# Patient Record
Sex: FEMALE | Race: WHITE | NOT HISPANIC OR LATINO | Employment: UNEMPLOYED | URBAN - NONMETROPOLITAN AREA
[De-identification: names, ages, dates, MRNs, and addresses within clinical notes are randomized per-mention and may not be internally consistent; named-entity substitution may affect disease eponyms.]

---

## 2017-01-17 ENCOUNTER — HOSPITAL ENCOUNTER (EMERGENCY)
Facility: HOSPITAL | Age: 22
Discharge: HOME | End: 2017-01-17
Attending: EMERGENCY MEDICINE | Admitting: EMERGENCY MEDICINE
Payer: COMMERCIAL

## 2017-01-17 VITALS
HEART RATE: 100 BPM | WEIGHT: 140 LBS | HEIGHT: 64 IN | TEMPERATURE: 98.1 F | OXYGEN SATURATION: 98 % | BODY MASS INDEX: 23.9 KG/M2 | DIASTOLIC BLOOD PRESSURE: 68 MMHG | SYSTOLIC BLOOD PRESSURE: 110 MMHG | RESPIRATION RATE: 16 BRPM

## 2017-01-17 DIAGNOSIS — F15.20 METHAMPHETAMINE ADDICTION (HCC): ICD-10-CM

## 2017-01-17 DIAGNOSIS — F19.20 DRUG ABUSE AND DEPENDENCE (HCC): Primary | ICD-10-CM

## 2017-01-17 DIAGNOSIS — F11.23 HEROIN WITHDRAWAL (HCC): ICD-10-CM

## 2017-01-17 DIAGNOSIS — F11.10 HEROIN ABUSE (HCC): ICD-10-CM

## 2017-01-17 LAB
ALBUMIN SERPL BCP-MCNC: 3.3 G/DL (ref 3.5–5)
ALP SERPL-CCNC: 162 U/L (ref 46–116)
ALT SERPL W P-5'-P-CCNC: 342 U/L (ref 12–78)
ANION GAP SERPL CALCULATED.3IONS-SCNC: 11 MMOL/L (ref 4–13)
AST SERPL W P-5'-P-CCNC: 175 U/L (ref 5–45)
BASOPHILS # BLD AUTO: 0.02 THOUSANDS/ΜL (ref 0–0.1)
BASOPHILS NFR BLD AUTO: 0 % (ref 0–1)
BILIRUB SERPL-MCNC: 0.7 MG/DL (ref 0.2–1)
BILIRUB UR QL STRIP: NEGATIVE
BUN SERPL-MCNC: 11 MG/DL (ref 5–25)
CALCIUM SERPL-MCNC: 9.5 MG/DL (ref 8.3–10.1)
CHLORIDE SERPL-SCNC: 104 MMOL/L (ref 100–108)
CLARITY UR: NORMAL
CO2 SERPL-SCNC: 23 MMOL/L (ref 21–32)
COLOR UR: YELLOW
CREAT SERPL-MCNC: 0.66 MG/DL (ref 0.6–1.3)
EOSINOPHIL # BLD AUTO: 0.15 THOUSAND/ΜL (ref 0–0.61)
EOSINOPHIL NFR BLD AUTO: 1 % (ref 0–6)
ERYTHROCYTE [DISTWIDTH] IN BLOOD BY AUTOMATED COUNT: 13.2 % (ref 11.6–15.1)
GFR SERPL CREATININE-BSD FRML MDRD: >60 ML/MIN/1.73SQ M
GLUCOSE SERPL-MCNC: 112 MG/DL (ref 65–140)
GLUCOSE UR STRIP-MCNC: NEGATIVE MG/DL
HCG UR QL: NEGATIVE
HCT VFR BLD AUTO: 41.2 % (ref 34.8–46.1)
HGB BLD-MCNC: 13.7 G/DL (ref 11.5–15.4)
HGB UR QL STRIP.AUTO: NEGATIVE
KETONES UR STRIP-MCNC: NEGATIVE MG/DL
LEUKOCYTE ESTERASE UR QL STRIP: NEGATIVE
LYMPHOCYTES # BLD AUTO: 1.84 THOUSANDS/ΜL (ref 0.6–4.47)
LYMPHOCYTES NFR BLD AUTO: 13 % (ref 14–44)
MCH RBC QN AUTO: 28.7 PG (ref 26.8–34.3)
MCHC RBC AUTO-ENTMCNC: 33.3 G/DL (ref 31.4–37.4)
MCV RBC AUTO: 86 FL (ref 82–98)
MONOCYTES # BLD AUTO: 1.1 THOUSAND/ΜL (ref 0.17–1.22)
MONOCYTES NFR BLD AUTO: 8 % (ref 4–12)
NEUTROPHILS # BLD AUTO: 10.82 THOUSANDS/ΜL (ref 1.85–7.62)
NEUTS SEG NFR BLD AUTO: 78 % (ref 43–75)
NITRITE UR QL STRIP: NEGATIVE
PH UR STRIP.AUTO: 6 [PH] (ref 4.5–8)
PLATELET # BLD AUTO: 202 THOUSANDS/UL (ref 149–390)
PMV BLD AUTO: 9.8 FL (ref 8.9–12.7)
POTASSIUM SERPL-SCNC: 4.2 MMOL/L (ref 3.5–5.3)
PROT SERPL-MCNC: 7.3 G/DL (ref 6.4–8.2)
PROT UR STRIP-MCNC: NEGATIVE MG/DL
RBC # BLD AUTO: 4.78 MILLION/UL (ref 3.81–5.12)
SODIUM SERPL-SCNC: 138 MMOL/L (ref 136–145)
SP GR UR STRIP.AUTO: <=1.005 (ref 1–1.03)
UROBILINOGEN UR QL STRIP.AUTO: 0.2 E.U./DL
WBC # BLD AUTO: 13.93 THOUSAND/UL (ref 4.31–10.16)

## 2017-01-17 PROCEDURE — 80053 COMPREHEN METABOLIC PANEL: CPT | Performed by: EMERGENCY MEDICINE

## 2017-01-17 PROCEDURE — 36415 COLL VENOUS BLD VENIPUNCTURE: CPT | Performed by: EMERGENCY MEDICINE

## 2017-01-17 PROCEDURE — 81025 URINE PREGNANCY TEST: CPT | Performed by: EMERGENCY MEDICINE

## 2017-01-17 PROCEDURE — 85025 COMPLETE CBC W/AUTO DIFF WBC: CPT | Performed by: EMERGENCY MEDICINE

## 2017-01-17 PROCEDURE — 99283 EMERGENCY DEPT VISIT LOW MDM: CPT

## 2017-01-17 PROCEDURE — 96374 THER/PROPH/DIAG INJ IV PUSH: CPT

## 2017-01-17 PROCEDURE — 81003 URINALYSIS AUTO W/O SCOPE: CPT | Performed by: EMERGENCY MEDICINE

## 2017-01-17 RX ORDER — ONDANSETRON 4 MG/1
4 TABLET, ORALLY DISINTEGRATING ORAL EVERY 8 HOURS PRN
Qty: 20 TABLET | Refills: 0 | Status: SHIPPED | OUTPATIENT
Start: 2017-01-17 | End: 2017-09-01 | Stop reason: HOSPADM

## 2017-01-17 RX ORDER — DIAZEPAM 5 MG/ML
10 INJECTION, SOLUTION INTRAMUSCULAR; INTRAVENOUS ONCE
Status: COMPLETED | OUTPATIENT
Start: 2017-01-17 | End: 2017-01-17

## 2017-01-17 RX ADMIN — DIAZEPAM 10 MG: 5 INJECTION, SOLUTION INTRAMUSCULAR; INTRAVENOUS at 05:49

## 2017-08-28 ENCOUNTER — HOSPITAL ENCOUNTER (EMERGENCY)
Facility: HOSPITAL | Age: 22
Discharge: HOME | End: 2017-08-28
Attending: EMERGENCY MEDICINE
Payer: COMMERCIAL

## 2017-08-28 VITALS
BODY MASS INDEX: 24.75 KG/M2 | TEMPERATURE: 97.6 F | DIASTOLIC BLOOD PRESSURE: 54 MMHG | OXYGEN SATURATION: 100 % | RESPIRATION RATE: 18 BRPM | HEART RATE: 97 BPM | WEIGHT: 144.18 LBS | SYSTOLIC BLOOD PRESSURE: 92 MMHG

## 2017-08-28 DIAGNOSIS — T50.901A: Primary | ICD-10-CM

## 2017-08-28 PROCEDURE — 99283 EMERGENCY DEPT VISIT LOW MDM: CPT

## 2017-08-29 ENCOUNTER — HOSPITAL ENCOUNTER (EMERGENCY)
Facility: HOSPITAL | Age: 22
Discharge: LEFT BEFORE DISCHARGE | End: 2017-08-29
Payer: COMMERCIAL

## 2017-08-29 ENCOUNTER — APPOINTMENT (EMERGENCY)
Dept: ULTRASOUND IMAGING | Facility: HOSPITAL | Age: 22
End: 2017-08-29
Payer: COMMERCIAL

## 2017-08-29 ENCOUNTER — HOSPITAL ENCOUNTER (EMERGENCY)
Facility: HOSPITAL | Age: 22
End: 2017-08-30
Admitting: EMERGENCY MEDICINE
Payer: COMMERCIAL

## 2017-08-29 VITALS
TEMPERATURE: 98.1 F | SYSTOLIC BLOOD PRESSURE: 101 MMHG | OXYGEN SATURATION: 100 % | WEIGHT: 144.84 LBS | DIASTOLIC BLOOD PRESSURE: 55 MMHG | HEIGHT: 64 IN | BODY MASS INDEX: 24.73 KG/M2 | HEART RATE: 95 BPM | RESPIRATION RATE: 18 BRPM

## 2017-08-29 DIAGNOSIS — F19.10 POLYSUBSTANCE ABUSE (HCC): Primary | ICD-10-CM

## 2017-08-29 DIAGNOSIS — N93.9 VAGINAL BLEEDING: Primary | ICD-10-CM

## 2017-08-29 DIAGNOSIS — F32.A DEPRESSION: ICD-10-CM

## 2017-08-29 LAB
ALBUMIN SERPL BCP-MCNC: 3.3 G/DL (ref 3.5–5)
ALP SERPL-CCNC: 63 U/L (ref 46–116)
ALT SERPL W P-5'-P-CCNC: 18 U/L (ref 12–78)
AMPHETAMINES SERPL QL SCN: POSITIVE
ANION GAP SERPL CALCULATED.3IONS-SCNC: 7 MMOL/L (ref 4–13)
APAP SERPL-MCNC: <2 UG/ML (ref 10–30)
APTT PPP: 33 SECONDS (ref 23–35)
AST SERPL W P-5'-P-CCNC: 15 U/L (ref 5–45)
B-HCG SERPL-ACNC: 9464 MIU/ML
BARBITURATES UR QL: NEGATIVE
BASOPHILS # BLD AUTO: 0.06 THOUSANDS/ΜL (ref 0–0.1)
BASOPHILS NFR BLD AUTO: 1 % (ref 0–1)
BENZODIAZ UR QL: POSITIVE
BILIRUB SERPL-MCNC: 0.7 MG/DL (ref 0.2–1)
BUN SERPL-MCNC: 11 MG/DL (ref 5–25)
CALCIUM SERPL-MCNC: 9.5 MG/DL (ref 8.3–10.1)
CHLORIDE SERPL-SCNC: 103 MMOL/L (ref 100–108)
CO2 SERPL-SCNC: 26 MMOL/L (ref 21–32)
COCAINE UR QL: NEGATIVE
CREAT SERPL-MCNC: 0.71 MG/DL (ref 0.6–1.3)
EOSINOPHIL # BLD AUTO: 0.2 THOUSAND/ΜL (ref 0–0.61)
EOSINOPHIL NFR BLD AUTO: 2 % (ref 0–6)
ERYTHROCYTE [DISTWIDTH] IN BLOOD BY AUTOMATED COUNT: 13.7 % (ref 11.6–15.1)
ETHANOL SERPL-MCNC: <3 MG/DL (ref 0–3)
GFR SERPL CREATININE-BSD FRML MDRD: 120 ML/MIN/1.73SQ M
GLUCOSE SERPL-MCNC: 94 MG/DL (ref 65–140)
HCT VFR BLD AUTO: 35.5 % (ref 34.8–46.1)
HGB BLD-MCNC: 11.8 G/DL (ref 11.5–15.4)
INR PPP: 0.91 (ref 0.86–1.16)
LYMPHOCYTES # BLD AUTO: 3.01 THOUSANDS/ΜL (ref 0.6–4.47)
LYMPHOCYTES NFR BLD AUTO: 23 % (ref 14–44)
MCH RBC QN AUTO: 27.7 PG (ref 26.8–34.3)
MCHC RBC AUTO-ENTMCNC: 33.2 G/DL (ref 31.4–37.4)
MCV RBC AUTO: 83 FL (ref 82–98)
METHADONE UR QL: NEGATIVE
MONOCYTES # BLD AUTO: 1.12 THOUSAND/ΜL (ref 0.17–1.22)
MONOCYTES NFR BLD AUTO: 9 % (ref 4–12)
NEUTROPHILS # BLD AUTO: 8.67 THOUSANDS/ΜL (ref 1.85–7.62)
NEUTS SEG NFR BLD AUTO: 65 % (ref 43–75)
OPIATES UR QL SCN: POSITIVE
PCP UR QL: NEGATIVE
PLATELET # BLD AUTO: 308 THOUSANDS/UL (ref 149–390)
PMV BLD AUTO: 9.2 FL (ref 8.9–12.7)
POTASSIUM SERPL-SCNC: 4.2 MMOL/L (ref 3.5–5.3)
PROT SERPL-MCNC: 7.5 G/DL (ref 6.4–8.2)
PROTHROMBIN TIME: 12.2 SECONDS (ref 12.1–14.4)
RBC # BLD AUTO: 4.26 MILLION/UL (ref 3.81–5.12)
SALICYLATES SERPL-MCNC: <3 MG/DL (ref 3–20)
SODIUM SERPL-SCNC: 136 MMOL/L (ref 136–145)
THC UR QL: NEGATIVE
WBC # BLD AUTO: 13.06 THOUSAND/UL (ref 4.31–10.16)

## 2017-08-29 PROCEDURE — 85730 THROMBOPLASTIN TIME PARTIAL: CPT | Performed by: PHYSICIAN ASSISTANT

## 2017-08-29 PROCEDURE — 80307 DRUG TEST PRSMV CHEM ANLYZR: CPT | Performed by: PHYSICIAN ASSISTANT

## 2017-08-29 PROCEDURE — 36415 COLL VENOUS BLD VENIPUNCTURE: CPT | Performed by: PHYSICIAN ASSISTANT

## 2017-08-29 PROCEDURE — 80320 DRUG SCREEN QUANTALCOHOLS: CPT | Performed by: PHYSICIAN ASSISTANT

## 2017-08-29 PROCEDURE — 85025 COMPLETE CBC W/AUTO DIFF WBC: CPT | Performed by: PHYSICIAN ASSISTANT

## 2017-08-29 PROCEDURE — 80053 COMPREHEN METABOLIC PANEL: CPT | Performed by: PHYSICIAN ASSISTANT

## 2017-08-29 PROCEDURE — 76801 OB US < 14 WKS SINGLE FETUS: CPT

## 2017-08-29 PROCEDURE — 85610 PROTHROMBIN TIME: CPT | Performed by: PHYSICIAN ASSISTANT

## 2017-08-29 PROCEDURE — 84702 CHORIONIC GONADOTROPIN TEST: CPT | Performed by: PHYSICIAN ASSISTANT

## 2017-08-29 PROCEDURE — 96372 THER/PROPH/DIAG INJ SC/IM: CPT

## 2017-08-29 PROCEDURE — 96374 THER/PROPH/DIAG INJ IV PUSH: CPT

## 2017-08-29 PROCEDURE — 99283 EMERGENCY DEPT VISIT LOW MDM: CPT

## 2017-08-29 PROCEDURE — 80329 ANALGESICS NON-OPIOID 1 OR 2: CPT | Performed by: PHYSICIAN ASSISTANT

## 2017-08-29 RX ORDER — HALOPERIDOL 5 MG/ML
INJECTION INTRAMUSCULAR
Status: COMPLETED
Start: 2017-08-29 | End: 2017-08-29

## 2017-08-29 RX ORDER — HALOPERIDOL 5 MG/ML
5 INJECTION INTRAMUSCULAR ONCE
Status: COMPLETED | OUTPATIENT
Start: 2017-08-29 | End: 2017-08-29

## 2017-08-29 RX ORDER — LORAZEPAM 2 MG/ML
1 INJECTION INTRAMUSCULAR ONCE
Status: COMPLETED | OUTPATIENT
Start: 2017-08-29 | End: 2017-08-29

## 2017-08-29 RX ORDER — LORAZEPAM 2 MG/ML
INJECTION INTRAMUSCULAR
Status: COMPLETED
Start: 2017-08-29 | End: 2017-08-29

## 2017-08-29 RX ORDER — CLONIDINE HYDROCHLORIDE 0.1 MG/1
0.2 TABLET ORAL ONCE
Status: COMPLETED | OUTPATIENT
Start: 2017-08-29 | End: 2017-08-29

## 2017-08-29 RX ADMIN — LORAZEPAM 1 MG: 2 INJECTION INTRAMUSCULAR; INTRAVENOUS at 16:13

## 2017-08-29 RX ADMIN — HALOPERIDOL LACTATE 5 MG: 5 INJECTION, SOLUTION INTRAMUSCULAR at 15:00

## 2017-08-29 RX ADMIN — CLONIDINE HYDROCHLORIDE 0.2 MG: 0.1 TABLET ORAL at 16:21

## 2017-08-29 RX ADMIN — HALOPERIDOL LACTATE 5 MG: 5 INJECTION, SOLUTION INTRAMUSCULAR at 16:13

## 2017-08-29 RX ADMIN — LORAZEPAM 1 MG: 2 INJECTION INTRAMUSCULAR at 15:01

## 2017-08-29 RX ADMIN — LORAZEPAM 1 MG: 2 INJECTION, SOLUTION INTRAMUSCULAR; INTRAVENOUS at 15:01

## 2017-08-30 VITALS
DIASTOLIC BLOOD PRESSURE: 68 MMHG | OXYGEN SATURATION: 100 % | TEMPERATURE: 98.9 F | BODY MASS INDEX: 24.73 KG/M2 | RESPIRATION RATE: 20 BRPM | HEART RATE: 110 BPM | WEIGHT: 144.84 LBS | HEIGHT: 64 IN | SYSTOLIC BLOOD PRESSURE: 104 MMHG

## 2017-08-30 LAB — B-HCG SERPL-ACNC: 3182 MIU/ML

## 2017-08-30 PROCEDURE — 84702 CHORIONIC GONADOTROPIN TEST: CPT | Performed by: EMERGENCY MEDICINE

## 2017-08-30 PROCEDURE — 36415 COLL VENOUS BLD VENIPUNCTURE: CPT | Performed by: EMERGENCY MEDICINE

## 2017-08-30 PROCEDURE — 99285 EMERGENCY DEPT VISIT HI MDM: CPT

## 2017-08-30 RX ORDER — LORAZEPAM 1 MG/1
1 TABLET ORAL EVERY 4 HOURS PRN
Status: CANCELLED | OUTPATIENT
Start: 2017-08-30

## 2017-08-30 RX ORDER — LORAZEPAM 1 MG/1
1 TABLET ORAL ONCE
Status: COMPLETED | OUTPATIENT
Start: 2017-08-30 | End: 2017-08-30

## 2017-08-30 RX ORDER — TRAZODONE HYDROCHLORIDE 50 MG/1
50 TABLET ORAL
Status: CANCELLED | OUTPATIENT
Start: 2017-08-30

## 2017-08-30 RX ORDER — LORAZEPAM 2 MG/ML
2 INJECTION INTRAMUSCULAR EVERY 4 HOURS PRN
Status: CANCELLED | OUTPATIENT
Start: 2017-08-30

## 2017-08-30 RX ORDER — MAGNESIUM HYDROXIDE/ALUMINUM HYDROXICE/SIMETHICONE 120; 1200; 1200 MG/30ML; MG/30ML; MG/30ML
30 SUSPENSION ORAL EVERY 4 HOURS PRN
Status: CANCELLED | OUTPATIENT
Start: 2017-08-30

## 2017-08-30 RX ORDER — BENZTROPINE MESYLATE 1 MG/ML
1 INJECTION INTRAMUSCULAR; INTRAVENOUS EVERY 6 HOURS PRN
Status: CANCELLED | OUTPATIENT
Start: 2017-08-30

## 2017-08-30 RX ORDER — RISPERIDONE 1 MG/1
1 TABLET, ORALLY DISINTEGRATING ORAL
Status: CANCELLED | OUTPATIENT
Start: 2017-08-30

## 2017-08-30 RX ORDER — ACETAMINOPHEN 325 MG/1
650 TABLET ORAL EVERY 6 HOURS PRN
Status: CANCELLED | OUTPATIENT
Start: 2017-08-30

## 2017-08-30 RX ORDER — CHLORPROMAZINE HYDROCHLORIDE 25 MG/ML
50 INJECTION INTRAMUSCULAR EVERY 8 HOURS PRN
Status: CANCELLED | OUTPATIENT
Start: 2017-08-30

## 2017-08-30 RX ORDER — OLANZAPINE 10 MG/1
10 TABLET ORAL
Status: CANCELLED | OUTPATIENT
Start: 2017-08-30

## 2017-08-30 RX ORDER — HALOPERIDOL 5 MG/ML
5 INJECTION INTRAMUSCULAR EVERY 6 HOURS PRN
Status: CANCELLED | OUTPATIENT
Start: 2017-08-30

## 2017-08-30 RX ORDER — HALOPERIDOL 5 MG
5 TABLET ORAL EVERY 6 HOURS PRN
Status: CANCELLED | OUTPATIENT
Start: 2017-08-30

## 2017-08-30 RX ORDER — ALPRAZOLAM 0.5 MG/1
1 TABLET ORAL ONCE
Status: COMPLETED | OUTPATIENT
Start: 2017-08-30 | End: 2017-08-30

## 2017-08-30 RX ORDER — BENZTROPINE MESYLATE 1 MG/1
1 TABLET ORAL EVERY 6 HOURS PRN
Status: CANCELLED | OUTPATIENT
Start: 2017-08-30

## 2017-08-30 RX ORDER — CHLORPROMAZINE HYDROCHLORIDE 25 MG/1
50 TABLET, FILM COATED ORAL EVERY 8 HOURS PRN
Status: CANCELLED | OUTPATIENT
Start: 2017-08-30

## 2017-08-30 RX ORDER — OLANZAPINE 10 MG/1
10 INJECTION, POWDER, LYOPHILIZED, FOR SOLUTION INTRAMUSCULAR
Status: CANCELLED | OUTPATIENT
Start: 2017-08-30

## 2017-08-30 RX ADMIN — ALPRAZOLAM 1 MG: 0.5 TABLET ORAL at 17:58

## 2017-08-30 RX ADMIN — LORAZEPAM 1 MG: 1 TABLET ORAL at 17:58

## 2017-08-31 ENCOUNTER — HOSPITAL ENCOUNTER (INPATIENT)
Facility: HOSPITAL | Age: 22
LOS: 1 days | Discharge: HOME | DRG: 885 | End: 2017-09-01
Attending: PSYCHIATRY & NEUROLOGY | Admitting: PSYCHIATRY & NEUROLOGY
Payer: COMMERCIAL

## 2017-08-31 DIAGNOSIS — F13.10 BENZODIAZEPINE ABUSE (HCC): ICD-10-CM

## 2017-08-31 DIAGNOSIS — F11.10 OPIOID ABUSE (HCC): ICD-10-CM

## 2017-08-31 DIAGNOSIS — F15.10 METHAMPHETAMINE ABUSE (HCC): ICD-10-CM

## 2017-08-31 DIAGNOSIS — F39 MOOD DISORDER (HCC): Primary | ICD-10-CM

## 2017-08-31 RX ORDER — LORAZEPAM 2 MG/ML
2 INJECTION INTRAMUSCULAR EVERY 4 HOURS PRN
Status: DISCONTINUED | OUTPATIENT
Start: 2017-08-31 | End: 2017-09-01 | Stop reason: HOSPADM

## 2017-08-31 RX ORDER — BENZTROPINE MESYLATE 1 MG/ML
1 INJECTION INTRAMUSCULAR; INTRAVENOUS EVERY 6 HOURS PRN
Status: DISCONTINUED | OUTPATIENT
Start: 2017-08-31 | End: 2017-09-01 | Stop reason: HOSPADM

## 2017-08-31 RX ORDER — TRAZODONE HYDROCHLORIDE 50 MG/1
50 TABLET ORAL
Status: DISCONTINUED | OUTPATIENT
Start: 2017-08-31 | End: 2017-09-01 | Stop reason: HOSPADM

## 2017-08-31 RX ORDER — CHLORPROMAZINE HYDROCHLORIDE 25 MG/1
50 TABLET, FILM COATED ORAL EVERY 8 HOURS PRN
Status: DISCONTINUED | OUTPATIENT
Start: 2017-08-31 | End: 2017-09-01 | Stop reason: HOSPADM

## 2017-08-31 RX ORDER — BENZTROPINE MESYLATE 1 MG/1
1 TABLET ORAL EVERY 6 HOURS PRN
Status: DISCONTINUED | OUTPATIENT
Start: 2017-08-31 | End: 2017-09-01 | Stop reason: HOSPADM

## 2017-08-31 RX ORDER — CHLORPROMAZINE HYDROCHLORIDE 25 MG/ML
50 INJECTION INTRAMUSCULAR EVERY 8 HOURS PRN
Status: DISCONTINUED | OUTPATIENT
Start: 2017-08-31 | End: 2017-09-01 | Stop reason: HOSPADM

## 2017-08-31 RX ORDER — ACETAMINOPHEN 325 MG/1
650 TABLET ORAL EVERY 6 HOURS PRN
Status: DISCONTINUED | OUTPATIENT
Start: 2017-08-31 | End: 2017-09-01 | Stop reason: HOSPADM

## 2017-08-31 RX ORDER — OLANZAPINE 10 MG/1
10 INJECTION, POWDER, LYOPHILIZED, FOR SOLUTION INTRAMUSCULAR
Status: DISCONTINUED | OUTPATIENT
Start: 2017-08-31 | End: 2017-09-01 | Stop reason: HOSPADM

## 2017-08-31 RX ORDER — MAGNESIUM HYDROXIDE/ALUMINUM HYDROXICE/SIMETHICONE 120; 1200; 1200 MG/30ML; MG/30ML; MG/30ML
30 SUSPENSION ORAL EVERY 4 HOURS PRN
Status: DISCONTINUED | OUTPATIENT
Start: 2017-08-31 | End: 2017-09-01 | Stop reason: HOSPADM

## 2017-08-31 RX ORDER — OLANZAPINE 10 MG/1
10 TABLET ORAL
Status: DISCONTINUED | OUTPATIENT
Start: 2017-08-31 | End: 2017-09-01 | Stop reason: HOSPADM

## 2017-08-31 RX ORDER — RISPERIDONE 1 MG/1
1 TABLET, ORALLY DISINTEGRATING ORAL
Status: DISCONTINUED | OUTPATIENT
Start: 2017-08-31 | End: 2017-09-01 | Stop reason: HOSPADM

## 2017-08-31 RX ORDER — HALOPERIDOL 5 MG/ML
5 INJECTION INTRAMUSCULAR EVERY 6 HOURS PRN
Status: DISCONTINUED | OUTPATIENT
Start: 2017-08-31 | End: 2017-09-01 | Stop reason: HOSPADM

## 2017-08-31 RX ORDER — RISPERIDONE 2 MG/1
2 TABLET, ORALLY DISINTEGRATING ORAL
Status: DISCONTINUED | OUTPATIENT
Start: 2017-08-31 | End: 2017-09-01

## 2017-08-31 RX ORDER — HALOPERIDOL 5 MG
5 TABLET ORAL EVERY 6 HOURS PRN
Status: DISCONTINUED | OUTPATIENT
Start: 2017-08-31 | End: 2017-09-01 | Stop reason: HOSPADM

## 2017-08-31 RX ORDER — LORAZEPAM 1 MG/1
1 TABLET ORAL EVERY 4 HOURS PRN
Status: DISCONTINUED | OUTPATIENT
Start: 2017-08-31 | End: 2017-09-01 | Stop reason: HOSPADM

## 2017-08-31 RX ADMIN — RISPERIDONE 2 MG: 2 TABLET, ORALLY DISINTEGRATING ORAL at 18:02

## 2017-08-31 RX ADMIN — TRAZODONE HYDROCHLORIDE 50 MG: 50 TABLET ORAL at 21:30

## 2017-08-31 RX ADMIN — LORAZEPAM 1 MG: 1 TABLET ORAL at 23:38

## 2017-08-31 RX ADMIN — LORAZEPAM 1 MG: 1 TABLET ORAL at 00:30

## 2017-08-31 RX ADMIN — LORAZEPAM 1 MG: 1 TABLET ORAL at 18:02

## 2017-08-31 RX ADMIN — TRAZODONE HYDROCHLORIDE 50 MG: 50 TABLET ORAL at 00:30

## 2017-09-01 VITALS
HEIGHT: 64 IN | HEART RATE: 111 BPM | DIASTOLIC BLOOD PRESSURE: 62 MMHG | WEIGHT: 130 LBS | SYSTOLIC BLOOD PRESSURE: 107 MMHG | RESPIRATION RATE: 16 BRPM | TEMPERATURE: 96.9 F | BODY MASS INDEX: 22.2 KG/M2

## 2017-09-01 LAB
ALBUMIN SERPL BCP-MCNC: 3.4 G/DL (ref 3.5–5)
ALP SERPL-CCNC: 58 U/L (ref 46–116)
ALT SERPL W P-5'-P-CCNC: 18 U/L (ref 12–78)
ANION GAP SERPL CALCULATED.3IONS-SCNC: 9 MMOL/L (ref 4–13)
ANISOCYTOSIS BLD QL SMEAR: PRESENT
AST SERPL W P-5'-P-CCNC: 12 U/L (ref 5–45)
B-HCG SERPL-ACNC: 632 MIU/ML
BASOPHILS # BLD MANUAL: 0 THOUSAND/UL (ref 0–0.1)
BASOPHILS NFR MAR MANUAL: 0 % (ref 0–1)
BILIRUB SERPL-MCNC: 0.4 MG/DL (ref 0.2–1)
BUN SERPL-MCNC: 14 MG/DL (ref 5–25)
CALCIUM SERPL-MCNC: 10 MG/DL (ref 8.3–10.1)
CHLORIDE SERPL-SCNC: 105 MMOL/L (ref 100–108)
CHOLEST SERPL-MCNC: 146 MG/DL (ref 50–200)
CO2 SERPL-SCNC: 26 MMOL/L (ref 21–32)
CREAT SERPL-MCNC: 0.86 MG/DL (ref 0.6–1.3)
EOSINOPHIL # BLD MANUAL: 0.12 THOUSAND/UL (ref 0–0.4)
EOSINOPHIL NFR BLD MANUAL: 1 % (ref 0–6)
ERYTHROCYTE [DISTWIDTH] IN BLOOD BY AUTOMATED COUNT: 13.9 % (ref 11.6–15.1)
GFR SERPL CREATININE-BSD FRML MDRD: 95 ML/MIN/1.73SQ M
GLUCOSE SERPL-MCNC: 95 MG/DL (ref 65–140)
HBV CORE AB SER QL: REACTIVE
HBV CORE IGM SER QL: ABNORMAL
HBV SURFACE AG SER QL: ABNORMAL
HCT VFR BLD AUTO: 33.2 % (ref 34.8–46.1)
HCV AB SER QL: ABNORMAL
HDLC SERPL-MCNC: 38 MG/DL (ref 40–60)
HGB BLD-MCNC: 10.9 G/DL (ref 11.5–15.4)
LDLC SERPL CALC-MCNC: 90 MG/DL (ref 0–100)
LYMPHOCYTES # BLD AUTO: 36 % (ref 14–44)
LYMPHOCYTES # BLD AUTO: 4.36 THOUSAND/UL (ref 0.6–4.47)
MCH RBC QN AUTO: 27.8 PG (ref 26.8–34.3)
MCHC RBC AUTO-ENTMCNC: 32.8 G/DL (ref 31.4–37.4)
MCV RBC AUTO: 85 FL (ref 82–98)
MONOCYTES # BLD AUTO: 0.85 THOUSAND/UL (ref 0–1.22)
MONOCYTES NFR BLD: 7 % (ref 4–12)
NEUTROPHILS # BLD MANUAL: 6.78 THOUSAND/UL (ref 1.85–7.62)
NEUTS SEG NFR BLD AUTO: 56 % (ref 43–75)
PLATELET # BLD AUTO: 296 THOUSANDS/UL (ref 149–390)
PLATELET BLD QL SMEAR: ADEQUATE
PMV BLD AUTO: 9.8 FL (ref 8.9–12.7)
POTASSIUM SERPL-SCNC: 4 MMOL/L (ref 3.5–5.3)
PROT SERPL-MCNC: 7.4 G/DL (ref 6.4–8.2)
RBC # BLD AUTO: 3.92 MILLION/UL (ref 3.81–5.12)
RBC MORPH BLD: PRESENT
RPR SER QL: NORMAL
SODIUM SERPL-SCNC: 140 MMOL/L (ref 136–145)
TOTAL CELLS COUNTED SPEC: 100
TRIGL SERPL-MCNC: 90 MG/DL
TSH SERPL DL<=0.05 MIU/L-ACNC: 0.37 UIU/ML (ref 0.36–3.74)
WBC # BLD AUTO: 12.11 THOUSAND/UL (ref 4.31–10.16)

## 2017-09-01 PROCEDURE — 84443 ASSAY THYROID STIM HORMONE: CPT | Performed by: PSYCHIATRY & NEUROLOGY

## 2017-09-01 PROCEDURE — 86592 SYPHILIS TEST NON-TREP QUAL: CPT | Performed by: PSYCHIATRY & NEUROLOGY

## 2017-09-01 PROCEDURE — 85007 BL SMEAR W/DIFF WBC COUNT: CPT | Performed by: PSYCHIATRY & NEUROLOGY

## 2017-09-01 PROCEDURE — 84702 CHORIONIC GONADOTROPIN TEST: CPT | Performed by: PSYCHIATRY & NEUROLOGY

## 2017-09-01 PROCEDURE — 86705 HEP B CORE ANTIBODY IGM: CPT | Performed by: PSYCHIATRY & NEUROLOGY

## 2017-09-01 PROCEDURE — 80061 LIPID PANEL: CPT | Performed by: PSYCHIATRY & NEUROLOGY

## 2017-09-01 PROCEDURE — 86704 HEP B CORE ANTIBODY TOTAL: CPT | Performed by: PSYCHIATRY & NEUROLOGY

## 2017-09-01 PROCEDURE — 86803 HEPATITIS C AB TEST: CPT | Performed by: PSYCHIATRY & NEUROLOGY

## 2017-09-01 PROCEDURE — 85027 COMPLETE CBC AUTOMATED: CPT | Performed by: PSYCHIATRY & NEUROLOGY

## 2017-09-01 PROCEDURE — 87389 HIV-1 AG W/HIV-1&-2 AB AG IA: CPT | Performed by: PSYCHIATRY & NEUROLOGY

## 2017-09-01 PROCEDURE — 87340 HEPATITIS B SURFACE AG IA: CPT | Performed by: PSYCHIATRY & NEUROLOGY

## 2017-09-01 PROCEDURE — 80053 COMPREHEN METABOLIC PANEL: CPT | Performed by: PSYCHIATRY & NEUROLOGY

## 2017-09-01 RX ORDER — NALTREXONE HYDROCHLORIDE 50 MG/1
25 TABLET, FILM COATED ORAL DAILY
Qty: 15 TABLET | Refills: 0 | Status: SHIPPED | OUTPATIENT
Start: 2017-09-01

## 2017-09-01 RX ORDER — DICYCLOMINE HCL 20 MG
20 TABLET ORAL EVERY 4 HOURS PRN
Status: DISCONTINUED | OUTPATIENT
Start: 2017-09-01 | End: 2017-09-01 | Stop reason: HOSPADM

## 2017-09-01 RX ORDER — NALTREXONE HYDROCHLORIDE 50 MG/1
25 TABLET, FILM COATED ORAL DAILY
Status: DISCONTINUED | OUTPATIENT
Start: 2017-09-01 | End: 2017-09-01 | Stop reason: HOSPADM

## 2017-09-01 RX ORDER — ONDANSETRON 4 MG/1
4 TABLET, ORALLY DISINTEGRATING ORAL EVERY 4 HOURS PRN
Status: DISCONTINUED | OUTPATIENT
Start: 2017-09-01 | End: 2017-09-01 | Stop reason: HOSPADM

## 2017-09-01 RX ORDER — LOPERAMIDE HYDROCHLORIDE 2 MG/1
2 CAPSULE ORAL EVERY 6 HOURS PRN
Status: DISCONTINUED | OUTPATIENT
Start: 2017-09-01 | End: 2017-09-01 | Stop reason: HOSPADM

## 2017-09-01 RX ADMIN — NALTREXONE HYDROCHLORIDE 25 MG: 50 TABLET, FILM COATED ORAL at 10:00

## 2017-09-04 LAB — HIV 1+2 AB+HIV1 P24 AG SERPL QL IA: NORMAL

## 2022-11-30 ENCOUNTER — VBI (OUTPATIENT)
Dept: ADMINISTRATIVE | Facility: OTHER | Age: 27
End: 2022-11-30

## 2022-12-28 ENCOUNTER — VBI (OUTPATIENT)
Dept: ADMINISTRATIVE | Facility: OTHER | Age: 27
End: 2022-12-28

## 2023-02-22 ENCOUNTER — HOSPITAL ENCOUNTER (INPATIENT)
Facility: HOSPITAL | Age: 28
LOS: 3 days | Discharge: HOME | End: 2023-02-25
Attending: INTERNAL MEDICINE | Admitting: EMERGENCY MEDICINE

## 2023-02-22 DIAGNOSIS — F19.10 POLYSUBSTANCE ABUSE (HCC): Primary | ICD-10-CM

## 2023-02-22 DIAGNOSIS — F11.10 OPIOID ABUSE (HCC): ICD-10-CM

## 2023-02-22 LAB
ALBUMIN SERPL BCP-MCNC: 4.3 G/DL (ref 3.5–5)
ALP SERPL-CCNC: 81 U/L (ref 43–122)
ALT SERPL W P-5'-P-CCNC: 23 U/L
AMPHETAMINES SERPL QL SCN: POSITIVE
ANION GAP SERPL CALCULATED.3IONS-SCNC: 6 MMOL/L (ref 5–14)
AST SERPL W P-5'-P-CCNC: 28 U/L (ref 14–36)
BARBITURATES UR QL: NEGATIVE
BASOPHILS # BLD AUTO: 0.05 THOUSANDS/ÂΜL (ref 0–0.1)
BASOPHILS NFR BLD AUTO: 1 % (ref 0–1)
BENZODIAZ UR QL: NEGATIVE
BILIRUB SERPL-MCNC: 0.45 MG/DL (ref 0.2–1)
BUN SERPL-MCNC: 13 MG/DL (ref 5–25)
CALCIUM SERPL-MCNC: 9.7 MG/DL (ref 8.4–10.2)
CHLORIDE SERPL-SCNC: 106 MMOL/L (ref 96–108)
CO2 SERPL-SCNC: 27 MMOL/L (ref 21–32)
COCAINE UR QL: NEGATIVE
CREAT SERPL-MCNC: 0.75 MG/DL (ref 0.6–1.2)
EOSINOPHIL # BLD AUTO: 0.29 THOUSAND/ÂΜL (ref 0–0.61)
EOSINOPHIL NFR BLD AUTO: 4 % (ref 0–6)
ERYTHROCYTE [DISTWIDTH] IN BLOOD BY AUTOMATED COUNT: 12.6 % (ref 11.6–15.1)
ETHANOL EXG-MCNC: 0 MG/DL
EXT PREGNANCY TEST URINE: NEGATIVE
EXT. CONTROL: NORMAL
FLUAV RNA RESP QL NAA+PROBE: NEGATIVE
FLUBV RNA RESP QL NAA+PROBE: NEGATIVE
GFR SERPL CREATININE-BSD FRML MDRD: 108 ML/MIN/1.73SQ M
GLUCOSE SERPL-MCNC: 109 MG/DL (ref 70–99)
HCT VFR BLD AUTO: 36.7 % (ref 34.8–46.1)
HGB BLD-MCNC: 11.7 G/DL (ref 11.5–15.4)
IMM GRANULOCYTES # BLD AUTO: 0.02 THOUSAND/UL (ref 0–0.2)
IMM GRANULOCYTES NFR BLD AUTO: 0 % (ref 0–2)
LYMPHOCYTES # BLD AUTO: 2.16 THOUSANDS/ÂΜL (ref 0.6–4.47)
LYMPHOCYTES NFR BLD AUTO: 29 % (ref 14–44)
MAGNESIUM SERPL-MCNC: 1.8 MG/DL (ref 1.6–2.3)
MCH RBC QN AUTO: 27.3 PG (ref 26.8–34.3)
MCHC RBC AUTO-ENTMCNC: 31.9 G/DL (ref 31.4–37.4)
MCV RBC AUTO: 86 FL (ref 82–98)
METHADONE UR QL: NEGATIVE
MONOCYTES # BLD AUTO: 0.73 THOUSAND/ÂΜL (ref 0.17–1.22)
MONOCYTES NFR BLD AUTO: 10 % (ref 4–12)
NEUTROPHILS # BLD AUTO: 4.13 THOUSANDS/ÂΜL (ref 1.85–7.62)
NEUTS SEG NFR BLD AUTO: 56 % (ref 43–75)
NRBC BLD AUTO-RTO: 0 /100 WBCS
OPIATES UR QL SCN: NEGATIVE
OXYCODONE+OXYMORPHONE UR QL SCN: NEGATIVE
PCP UR QL: NEGATIVE
PLATELET # BLD AUTO: 225 THOUSANDS/UL (ref 149–390)
PMV BLD AUTO: 9.1 FL (ref 8.9–12.7)
POTASSIUM SERPL-SCNC: 3.8 MMOL/L (ref 3.5–5.3)
PROT SERPL-MCNC: 8 G/DL (ref 6.4–8.4)
RBC # BLD AUTO: 4.28 MILLION/UL (ref 3.81–5.12)
RSV RNA RESP QL NAA+PROBE: NEGATIVE
SARS-COV-2 RNA RESP QL NAA+PROBE: NEGATIVE
SODIUM SERPL-SCNC: 139 MMOL/L (ref 135–147)
THC UR QL: NEGATIVE
WBC # BLD AUTO: 7.38 THOUSAND/UL (ref 4.31–10.16)

## 2023-02-22 RX ORDER — ACETAMINOPHEN 325 MG/1
650 TABLET ORAL EVERY 6 HOURS PRN
Status: DISCONTINUED | OUTPATIENT
Start: 2023-02-22 | End: 2023-02-25 | Stop reason: HOSPADM

## 2023-02-22 RX ORDER — GABAPENTIN 300 MG/1
300 CAPSULE ORAL EVERY 8 HOURS PRN
Status: DISCONTINUED | OUTPATIENT
Start: 2023-02-22 | End: 2023-02-25 | Stop reason: HOSPADM

## 2023-02-22 RX ORDER — CLONIDINE HYDROCHLORIDE 0.1 MG/1
0.1 TABLET ORAL EVERY 6 HOURS PRN
Status: DISCONTINUED | OUTPATIENT
Start: 2023-02-22 | End: 2023-02-25 | Stop reason: HOSPADM

## 2023-02-22 RX ORDER — MAGNESIUM HYDROXIDE/ALUMINUM HYDROXICE/SIMETHICONE 120; 1200; 1200 MG/30ML; MG/30ML; MG/30ML
30 SUSPENSION ORAL EVERY 6 HOURS PRN
Status: DISCONTINUED | OUTPATIENT
Start: 2023-02-22 | End: 2023-02-25 | Stop reason: HOSPADM

## 2023-02-22 RX ORDER — ONDANSETRON 2 MG/ML
4 INJECTION INTRAMUSCULAR; INTRAVENOUS EVERY 6 HOURS PRN
Status: DISCONTINUED | OUTPATIENT
Start: 2023-02-22 | End: 2023-02-25 | Stop reason: HOSPADM

## 2023-02-22 RX ORDER — CLONAZEPAM 1 MG/1
1 TABLET ORAL EVERY 6 HOURS PRN
Status: DISCONTINUED | OUTPATIENT
Start: 2023-02-22 | End: 2023-02-25 | Stop reason: HOSPADM

## 2023-02-22 RX ORDER — ENOXAPARIN SODIUM 100 MG/ML
40 INJECTION SUBCUTANEOUS DAILY
Status: DISCONTINUED | OUTPATIENT
Start: 2023-02-23 | End: 2023-02-25 | Stop reason: HOSPADM

## 2023-02-22 RX ORDER — IBUPROFEN 600 MG/1
600 TABLET ORAL EVERY 6 HOURS PRN
Status: DISCONTINUED | OUTPATIENT
Start: 2023-02-22 | End: 2023-02-25 | Stop reason: HOSPADM

## 2023-02-22 RX ORDER — NICOTINE 21 MG/24HR
1 PATCH, TRANSDERMAL 24 HOURS TRANSDERMAL DAILY
Status: DISCONTINUED | OUTPATIENT
Start: 2023-02-23 | End: 2023-02-25 | Stop reason: HOSPADM

## 2023-02-22 RX ORDER — BUPRENORPHINE 20 UG/H
20 PATCH TRANSDERMAL
Status: DISCONTINUED | OUTPATIENT
Start: 2023-02-22 | End: 2023-02-25

## 2023-02-22 RX ADMIN — BUPRENORPHINE 20 MCG: 20 PATCH TRANSDERMAL at 23:35

## 2023-02-23 ENCOUNTER — APPOINTMENT (INPATIENT)
Dept: NON INVASIVE DIAGNOSTICS | Facility: HOSPITAL | Age: 28
End: 2023-02-23

## 2023-02-23 PROBLEM — R01.1 SYSTOLIC MURMUR: Status: ACTIVE | Noted: 2023-02-23

## 2023-02-23 PROBLEM — F17.200 TOBACCO USE DISORDER: Status: ACTIVE | Noted: 2023-02-23

## 2023-02-23 PROBLEM — E66.3 OVERWEIGHT (BMI 25.0-29.9): Status: ACTIVE | Noted: 2023-02-23

## 2023-02-23 PROBLEM — F11.20 OPIOID USE DISORDER, SEVERE, DEPENDENCE (HCC): Status: ACTIVE | Noted: 2023-02-23

## 2023-02-23 PROBLEM — F11.93 OPIOID WITHDRAWAL (HCC): Status: ACTIVE | Noted: 2023-02-23

## 2023-02-23 PROBLEM — B18.2 CHRONIC HEPATITIS C WITHOUT HEPATIC COMA (HCC): Status: ACTIVE | Noted: 2023-02-23

## 2023-02-23 LAB
ANION GAP SERPL CALCULATED.3IONS-SCNC: 8 MMOL/L (ref 5–14)
AORTIC ROOT: 2.7 CM
APICAL FOUR CHAMBER EJECTION FRACTION: 63 %
ATRIAL RATE: 75 BPM
BUN SERPL-MCNC: 13 MG/DL (ref 5–25)
CALCIUM SERPL-MCNC: 9.4 MG/DL (ref 8.4–10.2)
CHLORIDE SERPL-SCNC: 106 MMOL/L (ref 96–108)
CHOLEST SERPL-MCNC: 128 MG/DL
CO2 SERPL-SCNC: 25 MMOL/L (ref 21–32)
CREAT SERPL-MCNC: 0.66 MG/DL (ref 0.6–1.2)
E WAVE DECELERATION TIME: 226 MS
ERYTHROCYTE [DISTWIDTH] IN BLOOD BY AUTOMATED COUNT: 12.7 % (ref 11.6–15.1)
FRACTIONAL SHORTENING: 36 (ref 28–44)
GFR SERPL CREATININE-BSD FRML MDRD: 119 ML/MIN/1.73SQ M
GLUCOSE SERPL-MCNC: 130 MG/DL (ref 70–99)
HCT VFR BLD AUTO: 37.1 % (ref 34.8–46.1)
HDLC SERPL-MCNC: 35 MG/DL
HGB BLD-MCNC: 11.9 G/DL (ref 11.5–15.4)
INTERVENTRICULAR SEPTUM IN DIASTOLE (PARASTERNAL SHORT AXIS VIEW): 0.6 CM
INTERVENTRICULAR SEPTUM: 0.6 CM (ref 0.6–1.1)
LAAS-AP2: 13.6 CM2
LAAS-AP4: 16.2 CM2
LDLC SERPL CALC-MCNC: 72 MG/DL
LEFT ATRIUM SIZE: 3.9 CM
LEFT INTERNAL DIMENSION IN SYSTOLE: 2.8 CM (ref 2.1–4)
LEFT VENTRICULAR INTERNAL DIMENSION IN DIASTOLE: 4.4 CM (ref 3.5–6)
LEFT VENTRICULAR POSTERIOR WALL IN END DIASTOLE: 0.6 CM
LEFT VENTRICULAR STROKE VOLUME: 58 ML
LVSV (TEICH): 58 ML
MAGNESIUM SERPL-MCNC: 2 MG/DL (ref 1.6–2.3)
MCH RBC QN AUTO: 27.1 PG (ref 26.8–34.3)
MCHC RBC AUTO-ENTMCNC: 32.1 G/DL (ref 31.4–37.4)
MCV RBC AUTO: 85 FL (ref 82–98)
MV E'TISSUE VEL-SEP: 11 CM/S
MV PEAK A VEL: 0.65 M/S
MV PEAK E VEL: 88 CM/S
MV STENOSIS PRESSURE HALF TIME: 66 MS
MV VALVE AREA P 1/2 METHOD: 3.33
P AXIS: 48 DEGREES
PLATELET # BLD AUTO: 231 THOUSANDS/UL (ref 149–390)
PMV BLD AUTO: 9.7 FL (ref 8.9–12.7)
POTASSIUM SERPL-SCNC: 3.8 MMOL/L (ref 3.5–5.3)
PR INTERVAL: 144 MS
QRS AXIS: 34 DEGREES
QRSD INTERVAL: 78 MS
QT INTERVAL: 418 MS
QTC INTERVAL: 466 MS
RBC # BLD AUTO: 4.39 MILLION/UL (ref 3.81–5.12)
RIGHT ATRIUM AREA SYSTOLE A4C: 12.8 CM2
RIGHT VENTRICLE ID DIMENSION: 3.1 CM
SL CV LEFT ATRIUM LENGTH A2C: 4.8 CM
SL CV PED ECHO LEFT VENTRICLE DIASTOLIC VOLUME (MOD BIPLANE) 2D: 87 ML
SL CV PED ECHO LEFT VENTRICLE SYSTOLIC VOLUME (MOD BIPLANE) 2D: 28 ML
SODIUM SERPL-SCNC: 139 MMOL/L (ref 135–147)
T WAVE AXIS: 21 DEGREES
TR MAX PG: 16 MMHG
TR PEAK VELOCITY: 2 M/S
TRICUSPID ANNULAR PLANE SYSTOLIC EXCURSION: 2.3 CM
TRICUSPID VALVE PEAK REGURGITATION VELOCITY: 2 M/S
TRIGL SERPL-MCNC: 103 MG/DL
VENTRICULAR RATE: 75 BPM
WBC # BLD AUTO: 6.69 THOUSAND/UL (ref 4.31–10.16)

## 2023-02-23 RX ADMIN — NICOTINE 1 PATCH: 14 PATCH, EXTENDED RELEASE TRANSDERMAL at 08:03

## 2023-02-23 RX ADMIN — MULTIPLE VITAMINS W/ MINERALS TAB 1 TABLET: TAB ORAL at 08:00

## 2023-02-23 RX ADMIN — GABAPENTIN 300 MG: 300 CAPSULE ORAL at 11:21

## 2023-02-23 RX ADMIN — IBUPROFEN 600 MG: 600 TABLET, FILM COATED ORAL at 11:21

## 2023-02-23 RX ADMIN — CLONAZEPAM 1 MG: 1 TABLET ORAL at 18:45

## 2023-02-23 RX ADMIN — CLONAZEPAM 1 MG: 1 TABLET ORAL at 07:55

## 2023-02-23 NOTE — ASSESSMENT & PLAN NOTE
· Patient with a history of chronic IV fentanyl use  · Uses 1 brick (50 bags) daily  · History of prior Suboxone use, short-term with Rx in 2021 and off the street   · Management of opioid withdrawal under MAT protocol as above  · Screen for HIV with h/o IVDU  · Chronic Hepatitis studies pending  · Case management consulted for assistance with aftercare resources

## 2023-02-23 NOTE — H&P
[facility]  H&P- Yovanny Scott 1995, 32 y o  female MRN: [id number]  Unit/Bed#: 5T DETOX 505-01 Encounter: [id number]  Primary Care Provider: No primary care provider on file     Date and time admitted to hospital: 2/22/2023  6:40 PM  HISTORY & PHYSICAL EXAM  DEPARTMENT OF MEDICAL TOXICOLOGY  LEVEL 4 MEDICAL DETOX UNIT  Yovanny Scott 32 y o  female MRN: [id number]  Unit/Bed#: 5T DETOX 505-01 Encounter: [id number]      Reason for Admission/Principal Problem: Opioid withdrawal, opioid use disorder  Admitting Provider: Naya Henry PA-C  Attending Provider: Lawson Sharif MD   2/22/2023  6:40 PM      * Opioid withdrawal [facility])  Assessment & Plan  · Patient with a history of chronic opioid use  · Last use was 2/22 around 1000  · Initiate COWS protocol with plan for eventual microinduction with Suboxone  · Currently experiencing mild opioid withdrawal s/sx of subjective chills/diaphoresis, diffuse myalgias/arthralgias, mild rhinorrhea/lacrimation  · Initiate Butrans 20 mcg/hr patch   · Continue monitoring opioid withdrawal, and plan for microinduction this AM (2/23) v  2/24 depending on pt's symptom severity and when willing to start Suboxone  · Symptomatic and supportive care  · Continuous pulse oximetry/telemetry monitoring    Opioid use disorder, severe, dependence ([facility] [address] )  Assessment & Plan  · Patient with a history of chronic IV fentanyl use  · Uses 1 brick (50 bags) daily  · History of prior Suboxone use, short-term with Rx in 2021 and off the street   · Management of opioid withdrawal under MAT protocol as above  · Screen for HIV with h/o IVDU  · Chronic Hepatitis studies pending  · Case management consulted for assistance with aftercare resources    Systolic murmur  Assessment & Plan  · Systolic murmur auscultated at L upper sternal border  · In the setting of IVDU  · Pt denies any known h/o heart murmur  · Pt afebrile, VSS, no evidence of active infection  · Echocardiogram ordered  · Continue monitoring VS, clinical status    Chronic hepatitis C without hepatic coma (HCC)  Assessment & Plan  · Pt with a h/o chronic hepatitis C since 2018  · Treatment naive  · LFTs WNL on admission  · Obtain HCV genotype and RNA  · Recommend OP f/u with GI for continued mgmt    Overweight (BMI 25 0-29  9)  Assessment & Plan  · Body mass index is 29 29 kg/m²  · Check Hgb A1c and lipid panel  · Recommend healthy diet, lifestyle modifications, cessation of IVDU      Methamphetamine abuse ([facility] [address] )  Assessment & Plan  · Pt reports daily use of methamphetamine  · Last use yesterday around 1000  · No acute EKG changes or evidence of amphetamine-induced psychosis  · Supportive care  · Encourage cessation      Tobacco use disorder  Assessment & Plan  · Daily tobacco user   · Offer NRT   · Encourage cessation            Additional medical treatment(s) includes as above       VTE Prophylaxis: Enoxaparin (Lovenox)  / sequential compression device   Code Status: full code      Anticipated Length of Stay:  Patient will be admitted on an Inpatient basis with an anticipated length of stay of  >2  midnights  Justification for Hospital Stay: Opioid withdrawal, opioid use disorder     For any questions or concerns, please Tiger Text the advanced practitioner in the role of [facility]-DETOX-AP On Call      This patient qualifies for Level IV medically managed intensive inpatient services under the criteria set by the American Society of Addiction Medicine, including dimensions 1-3   The patient is in withdrawal (or is intoxicated with high risk of withdrawal), with severe and unstable medical and/or psychiatric (dual diagnosis) problems, requiring requires 24-hour medical and nursing care and the full resources of a [address] patient to medical detox unit and continue supportive care and stabilization of acute opioid withdrawal per medical toxicology/detox medication assisted treatment pathway  Complicated Opioid Withdrawal (ie associated with long acting agents, such as methadone or illicit fentanyl analogues):  • >72 hours: Routine COWS/induction as per uncomplicated opoid withdrawal (below)  • <72 hours:  • Adjunctive medications (see below), including clonazepam 1-2mg Q6 hrs PRN anxiety (or diazepam 5 mg if cannot take PO)  • >48 hours, test dose buprenorphine 0 5-1mg  • If responds well, continue with 2mg Q2 hrs (total 8mg) holding for worsening withdrawal, then start maintenance dosing   • If responds poorly, follow next step below   • <48 hours and/or COWS < 6 or failed test dose, add Butrans transdermal patch 20-40mcg/hr, monitor for signs/symptoms of withdrawal   • If within first 24-48 hrs, can administer buprenorphine 0 5-1mg Q6 hrs x 4, followed by 2mg Q2 hrs x 4 the next day  • If surpassing 48 hrs, can administer buprenorphine 2mg Q2hrs if tolerated without transdermal patch or lower sublingual dose  • When complete, remove transdermal patch and start maintenance dosing   • For moderate-severe withdrawal (COWS >/= 8, may still consider routine induction with buprenorphine 8 mg if appropriate  • For worsened or precipitated withdrawal, consider adjunctive benzodiazepines and other comfort meds  Dexmedetomidine may be considered for severe precipitated withdrawal          Uncomplicated Opioid Withdrawal:  Monitor opioid severity via Clinical Opioid Withdrawal Scale (COWS) Q4 hours and administer buprenorphine/naloxone 8mg/2mg when COWS >8, or when greater than 24 hours have elapsed from most recent opioid use (excluding long-acting opioids, such as methadone)  Continue to monitor opioid severity Q30-60 minutes after first dose and administer additional buprenorphine 2-4mg every 30-60 minutes until COWS < 8 for two consecutive hours                Adjunctive medications administered as needed:  Clonidine 0 1 mg PO Q6 hours PRN anxiety or palpitations    Gabapentin 300mg PO Q8 hours PRN anxiety    Ibuprofen 600 mg PO Q6 hours PRN pain    Acetaminophen 1000mg PO Q8 hours PRN pain    Ondansetron 4 mg PO Q6 hours PRN N/V    Nicotine patch 7, 14, 21 mg  PRN nicotine withdrawal   Trazodone 50 mg PO QHS PRN sleep    Loperamide 4 mg PO PRN diarrhea up to 16 mg/day       The risks, benefits and mechanism of buprenorphine/naloxone were discussed and patient agreed to treatment  Case management consultation will take place to assist with coordination of subsequent treatment after discharge  Administer daily multivitamin  Evaluate and treat for coexisting substance use, such as nicotine  Discuss risk factors for infectious disease, such as history of intravenous drug abuse, and offer hepatitis and HIV screening if indicated  Hx and PE limited by: pt lethargic but is cooperative with hx/exam    HPI: Yusra Escobar is a 32y o  year old female with PMH of OUD and methamphetamine abuse who presents with opioid withdrawal seeking detoxification  Patient initially presented to the [facility] ED requesting detoxification from opioids and was subsequently transferred to the [facility] medical detox unit for medically assisted opioid withdrawal and MAT initiation with Suboxone  Patient reports using 1 brick of heroin/fentanyl daily via IV  Last opioid use was yesterday morning at 1000  She reports injecting into her neck and BUEs  Denies licking the needle prior to injection, denies sharing needles, but has in the past   She does endorse a history of chronic hepatitis C, which has never been treated  Reports current opioid withdrawal sx of chills/diaphoresis, mild, diffuse myalgias/arthralgias, and mild rhinorrhea/lacrimation  She also reports polysubstance abuse with IV methamphetamine daily and occasional marijuana use  Last used methamphetamine yesterday at 10 a m   Patient's past treatment hx for OUD consists of a brief period of Suboxone MAT in 05-06/2021 per PDMP review (pt initially did not recall this) and admitted illicit Suboxone use off the street  Denies any current Suboxone use  Patient agreeable to initiation of Butrans 20 mcg/hr patch and supportive care at this time with plan for microinduction with Suboxone when at least 24 hours have passed since last opioid use  Opioids currently used: fentanyl  Route of use: intravenous  Date/Time of Last Opioid Use: yesterday morning around 10 AM  Current Signs/Symptoms of Opioid Withdrawal: chills/diaphoresis, myalgias/arthralgias and rhinorrhea/lacrimation    COWS score:   Clinical Opiate Withdrawal Scale  Pulse: 70  Resting Pulse Rate: Measured After Patient is Sitting or Lying for One Minute: Pulse rate 80 or below  GI Upset: Over Last Half Hour: No GI symptoms  Sweating: Over Past Half Hour Not Accounted for by Room Temperature of Patient Activity: No report of chills or flushing  Tremor: Observation of Outstretched Hands: No tremor  Restlessness: Observation During Assessment: Able to sit still  Yawning: Observation During Assessment: No yawning  Pupil Size: Pupils pinned or normal size for room light  Anxiety and Irritability: None  Bone or Joint Aches: If Patient was Having Pain Previously, Only the Additional Component Attributed to Opiate Withdrawal is Scored: Not present  Gooseflesh Skin: Skin is smooth  Runny Nose or Tearing: Not Accounted for by Cold Symptoms or Allergies: Not present  Clinical Opiate Withdrawal Scale Total Score: 0          Methadone & Buprenorphine History  History of prior treatment for opioid dependence? yes  Currently on Methadone Maintenance? no  History of prior treatment with Suboxone? yes  Currently taking Suboxone? no  History of using Suboxone without having a prescription? yes  History of IVDA?  yes  Co-existing substance use? yes    Review of PDMP: yes, no chronic/recent prescriptions    Social History     Substance and Sexual Activity   Alcohol Use No Social History     Substance and Sexual Activity   Drug Use Yes   • Types: Heroin, Benzodiazepines, Methamphetamines    Comment: heroin     Social History     Tobacco Use   Smoking Status Every Day   • Packs/day: 0 50   • Types: Cigarettes   Smokeless Tobacco Not on file       Review of Systems   Constitutional: Positive for chills and diaphoresis (hot flashes, 2/2 opioid w/d)  Negative for appetite change and fever  HENT: Positive for rhinorrhea (mild)  Negative for congestion and sore throat  Eyes: Negative for visual disturbance  Respiratory: Negative for cough and shortness of breath  Cardiovascular: Negative for chest pain and palpitations  Gastrointestinal: Negative for abdominal pain, blood in stool, diarrhea, nausea and vomiting  Genitourinary: Negative for difficulty urinating and hematuria  Musculoskeletal: Positive for arthralgias (mild, diffuse) and myalgias (mild, diffuse)  Negative for gait problem  Skin: Negative for color change and rash  Neurological: Negative for dizziness, tremors, weakness, light-headedness, numbness and headaches  Psychiatric/Behavioral: Negative for hallucinations and suicidal ideas  The patient is not nervous/anxious  Historical Information   Past Medical History:   Diagnosis Date   • Anxiety    • Asthma    • Chronic pain disorder    • Depression    • Scoliosis    • Scoliosis    • Self-injurious behavior    • Substance abuse ([facility] [address] )    • Suicide attempt ([facility] [address] )    • Withdrawal symptoms, drug or narcotic ([facility] [address] )      History reviewed  No pertinent surgical history  History reviewed  No pertinent family history  Social History   Marital Status: Single   Patient Pre-hospital Living Situation: Lives with her brother and his family  Patient Pre-hospital Level of Mobility: Independent  Patient Pre-hospital Diet Restrictions: None    No Known Allergies    Prior to Admission medications    Medication Sig Start Date End Date Taking?  Authorizing Provider naltrexone (REVIA) 50 mg tablet Take 0 5 tablets by mouth daily  Patient not taking: Reported on 2/23/2023 9/1/17   Kathiedwight Monica       Current Facility-Administered Medications   Medication Dose Route Frequency   • acetaminophen (TYLENOL) tablet 650 mg  650 mg Oral Q6H PRN   • aluminum-magnesium hydroxide-simethicone (MYLANTA) oral suspension 30 mL  30 mL Oral Q6H PRN   • clonazePAM (KlonoPIN) tablet 1 mg  1 mg Oral Q6H PRN   • cloNIDine (CATAPRES) tablet 0 1 mg  0 1 mg Oral Q6H PRN   • enoxaparin (LOVENOX) subcutaneous injection 40 mg  40 mg Subcutaneous Daily   • gabapentin (NEURONTIN) capsule 300 mg  300 mg Oral Q8H PRN   • ibuprofen (MOTRIN) tablet 600 mg  600 mg Oral Q6H PRN   • multivitamin-minerals (CENTRUM) tablet 1 tablet  1 tablet Oral Daily   • nicotine (NICODERM CQ) 14 mg/24hr TD 24 hr patch 1 patch  1 patch Transdermal Daily   • ondansetron (ZOFRAN) injection 4 mg  4 mg Intravenous Q6H PRN   • transdermal buprenorphine (BUTRANS) 20 mcg/hr TD patch 20 mcg  20 mcg Transdermal Q7 Days       Continuous Infusions:          Objective     No intake or output data in the 24 hours ending 02/23/23 0554    Invasive Devices:   Peripheral IV 02/22/23 Distal;Left;Upper;Ventral (anterior) Arm (Active)   Site Assessment WDL 02/22/23 2256   Dressing Type Transparent 02/22/23 2256   Line Status Saline locked 02/22/23 2256   Dressing Status Clean;Dry; Intact 02/22/23 2256       Vitals   Vitals:    02/22/23 1839 02/22/23 2100 02/22/23 2257 02/23/23 0544   BP: 147/88 132/79  (!) 95/46   TempSrc: Tympanic Temporal  Temporal   Pulse: 103 70 69 70   Resp: 20 20  20   Patient Position - Orthostatic VS: Sitting Sitting  Lying   Temp: 97 7 °F (36 5 °C) (!) 97 2 °F (36 2 °C)  98 3 °F (36 8 °C)       Physical Exam  Vitals and nursing note reviewed  Constitutional:       General: She is not in acute distress  Appearance: She is not diaphoretic        Comments: Pt lethargic but easily arousable, A&Ox4, follows commands HENT:      Head: Normocephalic and atraumatic  Right Ear: External ear normal       Left Ear: External ear normal       Nose: Nose normal  No rhinorrhea  Mouth/Throat:      Mouth: Mucous membranes are moist       Comments: Mild tongue fasciculations present  Eyes:      Extraocular Movements: Extraocular movements intact  Right eye: No nystagmus  Left eye: No nystagmus  Conjunctiva/sclera: Conjunctivae normal       Pupils: Pupils are equal, round, and reactive to light  Cardiovascular:      Rate and Rhythm: Normal rate and regular rhythm  Pulses:           Dorsalis pedis pulses are 2+ on the right side and 2+ on the left side  Posterior tibial pulses are 2+ on the right side and 2+ on the left side  Heart sounds: Murmur (systolic, at L upper sternal border) heard  No friction rub  No gallop  Pulmonary:      Effort: Pulmonary effort is normal  No respiratory distress  Breath sounds: Normal breath sounds  No wheezing, rhonchi or rales  Comments: Breath sounds decreased on L side compared to R, possibly 2/2 poor pt cooperation d/t somnolence  Abdominal:      General: Bowel sounds are normal  There is no distension  Palpations: Abdomen is soft  Tenderness: There is no abdominal tenderness  There is no guarding or rebound  Musculoskeletal:         General: No swelling  Cervical back: Neck supple  Right lower leg: No edema  Left lower leg: No edema  Skin:     General: Skin is warm and dry  Findings: No abscess, erythema or rash  Comments: Track marks to BLEs and R side of neck, no evidence of acute infection  No piloerection  Neurological:      General: No focal deficit present  Mental Status: She is oriented to person, place, and time  She is lethargic  GCS: GCS eye subscore is 3  GCS verbal subscore is 5  GCS motor subscore is 6  Cranial Nerves: No dysarthria or facial asymmetry  Motor: No tremor  Psychiatric:         Attention and Perception: Attention normal  She does not perceive auditory or visual hallucinations  Mood and Affect: Mood is not anxious  Speech: Speech normal          Behavior: Behavior is cooperative  Thought Content: Thought content does not include homicidal or suicidal ideation  Thought content does not include homicidal or suicidal plan  DATA    EKG, Pathology, and Other Studies: I have personally reviewed pertinent reports  EKG: NSR, trickle rate 75 bpm, no acute ST segment/T wave changes, QTc 466  Lab Results: I have personally reviewed pertinent reports          CBC ETOH     Lab Results   Component Value Date    WBC 7 38 02/22/2023    RBC 4 28 02/22/2023    HGB 11 7 02/22/2023    HCT 36 7 02/22/2023    MCV 86 02/22/2023    MCH 27 3 02/22/2023    MCHC 31 9 02/22/2023    RDW 12 6 02/22/2023     02/22/2023    MPV 9 1 02/22/2023      No results found for: LACTICACID   CMP UA         Component Value Date/Time    K 3 8 02/22/2023 2032     02/22/2023 2032    CO2 27 02/22/2023 2032    BUN 13 02/22/2023 2032    CREATININE 0 75 02/22/2023 2032         Component Value Date/Time    CALCIUM 9 7 02/22/2023 2032    ALKPHOS 81 02/22/2023 2032    AST 28 02/22/2023 2032    ALT 23 02/22/2023 2032      Lab Results   Component Value Date    CLARITYU Slightly Cloudy 01/17/2017    CLARITYU Cloudy 09/17/2015    COLORU Yellow 01/17/2017    COLORU Yellow 09/17/2015    SPECGRAV <=1 005 01/17/2017    SPECGRAV 1 020 09/17/2015    PHUR 6 0 01/17/2017    PHUR 6 0 09/17/2015    GLUCOSEU Negative 01/17/2017    GLUCOSEU Negative 09/17/2015    KETONESU Negative 01/17/2017    KETONESU Negative 09/17/2015    BLOODU Negative 01/17/2017    BLOODU Negative 09/17/2015    PROTEIN UA Negative 01/17/2017    NITRITE Negative 01/17/2017    NITRITE Negative 09/17/2015    BILIRUBINUR Negative 01/17/2017    BILIRUBINUR Negative 09/17/2015    UROBILINOGEN 0 2 01/17/2017 UROBILINOGEN 0 2 09/17/2015    LEUKOCYTESUR Negative 01/17/2017    LEUKOCYTESUR Negative 09/17/2015        Liver Function Test: ASA     Lab Results   Component Value Date    TBILI 0 45 02/22/2023    ALKPHOS 81 02/22/2023    AST 28 02/22/2023    ALT 23 02/22/2023    TP 8 0 02/22/2023    ALB 4 3 02/22/2023      Lab Results   Component Value Date    SALICYLATE <3 (L) 10/38/9814      Troponin APAP     No results found for: TROPONINI   Lab Results   Component Value Date    ACTMNPHEN <2 0 (L) 08/29/2017      VBG HCG     No results found for: PHVEN, MUC2SMR, PO2VEN, CIN9HUG, BEVEN, D3QGKKJRD, Y2CUJLG   Lab Results   Component Value Date    HCGQUANT 632 (H) 09/01/2017      ABG Urine Drug Screen     No results found for: PHART, OJW7RWF, PO2ART, QLN5LWA, BEART, C8OKHXRMS, O2HGB, SOURC, DEEPTHI, VTAC, ACRATE, INSPIREDAIR, PEEP   Lab Results   Component Value Date    AMPMETHUR Positive (A) 02/22/2023    BARBTUR Negative 02/22/2023    BDZUR Negative 02/22/2023    COCAINEUR Negative 02/22/2023    METHADONEUR Negative 02/22/2023    OPIATEUR Negative 02/22/2023    PCPUR Negative 02/22/2023    THCUR Negative 02/22/2023    OXYCODONEUR Negative 02/22/2023      Lactate INR     No results found for: LACTICACID   Lab Results   Component Value Date    INR 0 91 08/29/2017      PTT Protime     Lab Results   Component Value Date/Time    PTT 33 08/29/2017 11:12 AM      Lab Results   Component Value Date/Time    PROTIME 12 2 08/29/2017 11:12 AM      Hepatitis HIV     Lab Results   Component Value Date    HEPBSAG Non-reactive 09/01/2017    HEPCAB High Reactive (A) 09/01/2017      No results found for: [id number], [id number]         Imaging Studies: I have personally reviewed pertinent reports  Counseling / Coordination of Care  Total floor / unit time spent today 50 minutes  Greater than 50% of total time was spent with the patient and / or family counseling and / or coordination of care             ** Please Note: This note has been constructed using a voice recognition system   **

## 2023-02-23 NOTE — ED PROVIDER NOTES
History  Chief Complaint   Patient presents with   • Detox Evaluation     Wants detox from heroin and meth  Last used both this morning  22-year-old female with past medical history of substance abuse, psychiatric disorders, asthma, chronic pain disorder presents to emergency department for detox from heroin and meth  States that she uses a Rosy Jyoti of heroin IV daily, she also uses IV meth but she states a little bit less than the heroin  She last used a bundle of heroin and a little piece of meth this morning around 10 or 11 AM   She states that she has been using since she was 22years old  In December she was at either [employer] or [employer] run she does not remember for detox in the December  States she relapsed late December  Denies SI/HI  Denies any current withdrawal symptoms  History provided by:  Patient  Detox Evaluation  Suspected agents:  Heroin and methamphetamines  Associated symptoms: no abdominal pain, no agitation, no bladder incontinence, no bowel incontinence, no confusion, no hallucinations, no headaches, no loss of consciousness, no nausea, no palpitations, no seizures, no shortness of breath, no suicidal ideation, no violence, no vomiting and no weakness    Associated symptoms comment:  Denies fever       Prior to Admission Medications   Prescriptions Last Dose Informant Patient Reported? Taking?   naltrexone (REVIA) 50 mg tablet   No No   Sig: Take 0 5 tablets by mouth daily      Facility-Administered Medications: None       Past Medical History:   Diagnosis Date   • Anxiety    • Asthma    • Chronic pain disorder    • Depression    • Scoliosis    • Scoliosis    • Self-injurious behavior    • Substance abuse ([facility] )    • Suicide attempt ([facility] )    • Withdrawal symptoms, drug or narcotic ([facility] )        History reviewed  No pertinent surgical history  History reviewed  No pertinent family history  I have reviewed and agree with the history as documented      E-Cigarette/Vaping E-Cigarette/Vaping Substances     Social History     Tobacco Use   • Smoking status: Every Day     Packs/day: 0 50     Types: Cigarettes   Substance Use Topics   • Alcohol use: No   • Drug use: Yes     Types: Heroin, Benzodiazepines, Methamphetamines     Comment: heroin       Review of Systems   Constitutional: Negative for chills and fever  HENT: Negative for congestion, rhinorrhea and sore throat  Eyes: Negative for pain and visual disturbance  Respiratory: Negative for shortness of breath  Cardiovascular: Negative for chest pain and palpitations  Gastrointestinal: Negative for abdominal pain, blood in stool, bowel incontinence, nausea and vomiting  Genitourinary: Negative for bladder incontinence and decreased urine volume  Musculoskeletal: Negative for arthralgias, gait problem, joint swelling, neck pain and neck stiffness  Skin: Negative for color change, rash and wound  Neurological: Negative for dizziness, seizures, loss of consciousness, syncope, weakness and headaches  Psychiatric/Behavioral: Negative for agitation, confusion, hallucinations and suicidal ideas  All other systems reviewed and are negative  Physical Exam  Physical Exam  Vitals and nursing note reviewed  Constitutional:       General: She is not in acute distress  Appearance: Normal appearance  She is not ill-appearing, toxic-appearing or diaphoretic  HENT:      Head: Normocephalic and atraumatic  Mouth/Throat:      Mouth: Mucous membranes are moist       Pharynx: Oropharynx is clear  Eyes:      Extraocular Movements: Extraocular movements intact  Conjunctiva/sclera: Conjunctivae normal       Pupils: Pupils are equal, round, and reactive to light  Neck:     Cardiovascular:      Rate and Rhythm: Normal rate and regular rhythm  Pulses:           Radial pulses are 2+ on the right side and 2+ on the left side  Heart sounds: Normal heart sounds  No murmur heard    Pulmonary: Effort: Pulmonary effort is normal  No respiratory distress  Breath sounds: Normal breath sounds  Abdominal:      General: There is no distension  Palpations: Abdomen is soft  Tenderness: There is no abdominal tenderness  Musculoskeletal:         General: No swelling  Skin:     General: Skin is warm and dry  Capillary Refill: Capillary refill takes less than 2 seconds  Findings: No rash  Comments: Track marks dorsum of b/l hands  Non tender  No purulent discharge  No swelling  Mild erythema without streaking or warmth  Neurological:      Mental Status: She is alert and oriented to person, place, and time  GCS: GCS eye subscore is 4  GCS verbal subscore is 5  GCS motor subscore is 6  Gait: Gait normal          Vital Signs  ED Triage Vitals [02/22/23 1839]   Temperature Pulse Respirations Blood Pressure SpO2   97 7 °F (36 5 °C) 103 20 147/88 100 %      Temp Source Heart Rate Source Patient Position - Orthostatic VS BP Location FiO2 (%)   Tympanic Monitor Sitting Left arm --      Pain Score       --           Vitals:    02/22/23 1839   BP: 147/88   Pulse: 103   Patient Position - Orthostatic VS: Sitting         Visual Acuity      ED Medications  Medications - No data to display    Diagnostic Studies  Results Reviewed     Procedure Component Value Units Date/Time    Rapid drug screen, urine [[id number]]  (Abnormal) Collected: 02/22/23 2106    Lab Status: Final result Specimen: Urine, Clean Catch Updated: 02/22/23 2147     Amph/Meth UR Positive     Barbiturate Ur Negative     Benzodiazepine Urine Negative     Cocaine Urine Negative     Methadone Urine Negative     Opiate Urine Negative     PCP Ur Negative     THC Urine Negative     Oxycodone Urine Negative    Narrative:      FOR MEDICAL PURPOSES ONLY  IF CONFIRMATION NEEDED PLEASE CONTACT THE LAB WITHIN 5 DAYS      Drug Screen Cutoff Levels:  AMPHETAMINE/METHAMPHETAMINES  1000 ng/mL  BARBITURATES     200 ng/mL  BENZODIAZEPINES     200 ng/mL  COCAINE      300 ng/mL  METHADONE      300 ng/mL  OPIATES      300 ng/mL  PHENCYCLIDINE     25 ng/mL  THC       50 ng/mL  OXYCODONE      100 ng/mL    FLU/RSV/COVID - if FLU/RSV clinically relevant [[id number]]  (Normal) Collected: 02/22/23 2032    Lab Status: Final result Specimen: Nares from Nose Updated: 02/22/23 2119     SARS-CoV-2 Negative     INFLUENZA A PCR Negative     INFLUENZA B PCR Negative     RSV PCR Negative    Narrative:      FOR PEDIATRIC PATIENTS - copy/paste COVID Guidelines URL to browser: [email or web address]  The [employer] [place] Timesx    SARS-CoV-2 assay is a Nucleic Acid Amplification assay intended for the  qualitative detection of nucleic acid from SARS-CoV-2 in nasopharyngeal  swabs  Results are for the presumptive identification of SARS-CoV-2 RNA  Positive results are indicative of infection with SARS-CoV-2, the virus  causing COVID-19, but do not rule out bacterial infection or co-infection  with other viruses  Laboratories within the [place] and its  territories are required to report all positive results to the appropriate  public health authorities  Negative results do not preclude SARS-CoV-2  infection and should not be used as the sole basis for treatment or other  patient management decisions  Negative results must be combined with  clinical observations, patient history, and epidemiological information  This test has not been FDA cleared or approved  This test has been authorized by FDA under an Emergency Use Authorization  (EUA)  This test is only authorized for the duration of time the  declaration that circumstances exist justifying the authorization of the  emergency use of an in vitro diagnostic tests for detection of SARS-CoV-2  virus and/or diagnosis of COVID-19 infection under section 564(b)(1) of  the Act, 21 U  S C  453SAW-4(R)(0), unless the authorization is terminated  or revoked sooner   The test has been validated but independent review by FDA  and CLIA is pending  Test performed using [employer] Gene[employer]pert: This RT-PCR assay targets N2,  a region unique to SARS-CoV-2  A conserved region in the E-gene was chosen  for pan-Sarbecovirus detection which includes SARS-CoV-2  According to CMS-2020-01-R, this platform meets the definition of high-throughput technology      POCT pregnancy, urine [[id number]]  (Normal) Resulted: 02/22/23 2109    Lab Status: Final result Updated: 02/22/23 2109     EXT Preg Test, Ur Negative     Control Valid    Comprehensive metabolic panel [[id number]]  (Abnormal) Collected: 02/22/23 2032    Lab Status: Final result Specimen: Blood from Arm, Left Updated: 02/22/23 2055     Sodium 139 mmol/L      Potassium 3 8 mmol/L      Chloride 106 mmol/L      CO2 27 mmol/L      ANION GAP 6 mmol/L      BUN 13 mg/dL      Creatinine 0 75 mg/dL      Glucose 109 mg/dL      Calcium 9 7 mg/dL      AST 28 U/L      ALT 23 U/L      Alkaline Phosphatase 81 U/L      Total Protein 8 0 g/dL      Albumin 4 3 g/dL      Total Bilirubin 0 45 mg/dL      eGFR 108 ml/min/1 73sq m     Narrative:      [place] guidelines for Chronic Kidney Disease (CKD):   •  Stage 1 with normal or high GFR (GFR > 90 mL/min/1 73 square meters)  •  Stage 2 Mild CKD (GFR = 60-89 mL/min/1 73 square meters)  •  Stage 3A Moderate CKD (GFR = 45-59 mL/min/1 73 square meters)  •  Stage 3B Moderate CKD (GFR = 30-44 mL/min/1 73 square meters)  •  Stage 4 Severe CKD (GFR = 15-29 mL/min/1 73 square meters)  •  Stage 5 End Stage CKD (GFR <15 mL/min/1 73 square meters)  Note: GFR calculation is accurate only with a steady state creatinine    Magnesium [[id number]]  (Normal) Collected: 02/22/23 2032    Lab Status: Final result Specimen: Blood from Arm, Left Updated: 02/22/23 2055     Magnesium 1 8 mg/dL     CBC and differential [[id number]] Collected: 02/22/23 2032    Lab Status: Final result Specimen: Blood from Arm, Left Updated: 02/22/23 2041     WBC 7 38 Thousand/uL      RBC 4 28 Million/uL      Hemoglobin 11 7 g/dL      Hematocrit 36 7 %      MCV 86 fL      MCH 27 3 pg      MCHC 31 9 g/dL      RDW 12 6 %      MPV 9 1 fL      Platelets 809 Thousands/uL      nRBC 0 /100 WBCs      Neutrophils Relative 56 %      Immat GRANS % 0 %      Lymphocytes Relative 29 %      Monocytes Relative 10 %      Eosinophils Relative 4 %      Basophils Relative 1 %      Neutrophils Absolute 4 13 Thousands/µL      Immature Grans Absolute 0 02 Thousand/uL      Lymphocytes Absolute 2 16 Thousands/µL      Monocytes Absolute 0 73 Thousand/µL      Eosinophils Absolute 0 29 Thousand/µL      Basophils Absolute 0 05 Thousands/µL     POCT alcohol breath test [[id number]]  (Normal) Resulted: 02/22/23 2040    Lab Status: Final result Updated: 02/22/23 2040     EXTBreath Alcohol 0 00                 No orders to display              Procedures  Procedures         ED Course  ED Course as of 02/22/23 2209 Wed Feb 22, 2023 2021 Procedure Note: EKG  Date/Time: 02/22/23 8:21 PM   Performed by: Manju Melendrez   Authorized by: Manju Melendrez  ECG interpreted by me, the ED Provider: yes   The EKG demonstrates:  Rate 75 bpm  Rhythm NSR  QTc 466 ms   No ST elevations/depressions                                               Medical Decision Making  75-year-old female past medical history of substance abuse presenting for detox from heroin and methamphetamines  Reports IV drug abuse  Denies any systemic symptoms  Denies any wound infections  Denies SI/HI  Last use this morning  Denies any acute withdrawal symptoms  Vital signs are stable  She is afebrile  No acute distress  No respiratory distress  No findings on physical exam consistent with acute infection of injection sites  We will plan for detox evaluation with CBC, CMP, magnesium, COVID screening, urine hCG, point-of-care alcohol breath test, UDS, ECG  ECG without acute ischemic changes or dysrhythmia    No leukocytosis  Hemoglobin within normal limits  Electrolytes within normal limits  Kidney function, liver function test all within normal limits  Negative urine hCG   UDS positive for methamphetamines  Discussed with detox BLADIMIR Katherine Isaacs PA-C, patient accepted for detox  All imaging and/or lab testing discussed with patient  Patient and/or family members verbalizes understanding and agrees with plan for admission  Patient is stable for admission      Portions of the record may have been created with voice recognition software  Occasional wrong word or "sound a like" substitutions may have occurred due to the inherent limitations of voice recognition software  Read the chart carefully and recognize, using context, where substitutions have occurred  Polysubstance abuse [facility]): acute illness or injury  Amount and/or Complexity of Data Reviewed  Labs: ordered  Risk  Decision regarding hospitalization  Disposition  Final diagnoses:   Polysubstance abuse ([address] )     Time reflects when diagnosis was documented in both MDM as applicable and the Disposition within this note     Time User Action Codes Description Comment    2/22/2023  9:15 PM Manju Melendrez Add [F19 10] Polysubstance abuse [facility])       ED Disposition     ED Disposition   Admit    Condition   Stable    Date/Time   Wed Feb 22, 2023  9:16 PM    Comment   Case was discussed with Detox BLADIMIR and the patient's admission status was agreed to be Admission Status: inpatient status to the service of Dr Roel Chung   Follow-up Information    None         Patient's Medications   Discharge Prescriptions    No medications on file       No discharge procedures on file      PDMP Review       Value Time User    PDMP Reviewed  Yes 2/22/2023  7:23 PM Jennifer Smith PA-C          ED Provider  Electronically Signed by           Jennifer Smith PA-C  02/22/23 7812

## 2023-02-23 NOTE — PLAN OF CARE
Problem: SUBSTANCE USE/ABUSE  Goal: By discharge, will develop insight into their chemical dependency and sustain motivation to continue in recovery  Description: INTERVENTIONS:  - Attends all daily group sessions and scheduled AA groups  - Actively practices coping skills through participation in the therapeutic community and adherence to program rules  - Reviews and completes assignments from individual treatment plan  - Assist patient development of understanding of their personal cycle of addiction and relapse triggers  Outcome: Progressing  Goal: By discharge, patient will have ongoing treatment plan addressing chemical dependency  Description: INTERVENTIONS:  - Assist patient with resources and/or appointments for ongoing recovery based living  Outcome: Progressing     Problem: PAIN - ADULT  Goal: Verbalizes/displays adequate comfort level or baseline comfort level  Description: Interventions:  - Encourage patient to monitor pain and request assistance  - Assess pain using appropriate pain scale  - Administer analgesics based on type and severity of pain and evaluate response  - Implement non-pharmacological measures as appropriate and evaluate response  - Consider cultural and social influences on pain and pain management  - Notify physician/advanced practitioner if interventions unsuccessful or patient reports new pain  Outcome: Progressing     Problem: INFECTION - ADULT  Goal: Absence or prevention of progression during hospitalization  Description: INTERVENTIONS:  - Assess and monitor for signs and symptoms of infection  - Monitor lab/diagnostic results  - Monitor all insertion sites, i e  indwelling lines, tubes, and drains  - Monitor endotracheal if appropriate and nasal secretions for changes in amount and color  - [place] appropriate cooling/warming therapies per order  - Administer medications as ordered  - Instruct and encourage patient and family to use good hand hygiene technique  - Identify and instruct in appropriate isolation precautions for identified infection/condition  Outcome: Progressing  Goal: Absence of fever/infection during neutropenic period  Description: INTERVENTIONS:  - Monitor WBC    Outcome: Progressing

## 2023-02-23 NOTE — ASSESSMENT & PLAN NOTE
· Body mass index is 29 29 kg/m²     · Check Hgb A1c and lipid panel  · Recommend healthy diet, lifestyle modifications, cessation of IVDU

## 2023-02-23 NOTE — PLAN OF CARE
Problem: SUBSTANCE USE/ABUSE  Goal: By discharge, will develop insight into their chemical dependency and sustain motivation to continue in recovery  Description: INTERVENTIONS:  - Attends all daily group sessions and scheduled AA groups  - Actively practices coping skills through participation in the therapeutic community and adherence to program rules  - Reviews and completes assignments from individual treatment plan  - Assist patient development of understanding of their personal cycle of addiction and relapse triggers  Outcome: Progressing  Goal: By discharge, patient will have ongoing treatment plan addressing chemical dependency  Description: INTERVENTIONS:  - Assist patient with resources and/or appointments for ongoing recovery based living  Outcome: Progressing     Problem: PAIN - ADULT  Goal: Verbalizes/displays adequate comfort level or baseline comfort level  Description: Interventions:  - Encourage patient to monitor pain and request assistance  - Assess pain using appropriate pain scale  - Administer analgesics based on type and severity of pain and evaluate response  - Implement non-pharmacological measures as appropriate and evaluate response  - Consider cultural and social influences on pain and pain management  - Notify physician/advanced practitioner if interventions unsuccessful or patient reports new pain  Outcome: Progressing     Problem: INFECTION - ADULT  Goal: Absence or prevention of progression during hospitalization  Description: INTERVENTIONS:  - Assess and monitor for signs and symptoms of infection  - Monitor lab/diagnostic results  - Monitor all insertion sites, i e  indwelling lines, tubes, and drains  - Monitor endotracheal if appropriate and nasal secretions for changes in amount and color  - [place] appropriate cooling/warming therapies per order  - Administer medications as ordered  - Instruct and encourage patient and family to use good hand hygiene technique  - Identify and instruct in appropriate isolation precautions for identified infection/condition  Outcome: Progressing  Goal: Absence of fever/infection during neutropenic period  Description: INTERVENTIONS:  - Monitor WBC    Outcome: Progressing     Problem: DISCHARGE PLANNING  Goal: Discharge to home or other facility with appropriate resources  Description: INTERVENTIONS:  - Identify barriers to discharge w/patient and caregiver  - Arrange for needed discharge resources and transportation as appropriate  - Identify discharge learning needs (meds, wound care, etc )  - Arrange for interpretive services to assist at discharge as needed  - Refer to Case Management Department for coordinating discharge planning if the patient needs post-hospital services based on physician/advanced practitioner order or complex needs related to functional status, cognitive ability, or social support system  Outcome: Progressing     Problem: Knowledge Deficit  Goal: Patient/family/caregiver demonstrates understanding of disease process, treatment plan, medications, and discharge instructions  Description: Complete learning assessment and assess knowledge base    Interventions:  - Provide teaching at level of understanding  - Provide teaching via preferred learning methods  Outcome: Progressing

## 2023-02-23 NOTE — ASSESSMENT & PLAN NOTE
· Pt reports daily use of methamphetamine  · Last use yesterday around 1000  · No acute EKG changes or evidence of amphetamine-induced psychosis  · Supportive care  · Encourage cessation

## 2023-02-23 NOTE — ASSESSMENT & PLAN NOTE
· Pt with a h/o chronic hepatitis C since 2018  · Treatment naive  · LFTs WNL on admission  · Obtain HCV genotype and RNA  · Recommend OP f/u with GI for continued mgmt

## 2023-02-23 NOTE — ASSESSMENT & PLAN NOTE
· Systolic murmur auscultated at L upper sternal border  · In the setting of IVDU  · Pt denies any known h/o heart murmur  · Pt afebrile, VSS, no evidence of active infection  · Echocardiogram ordered  · Continue monitoring VS, clinical status

## 2023-02-23 NOTE — DISCHARGE INSTR - OTHER ORDERS
DRUG AND ALCOHOL RESOURCES IN [place] [address]    If you have health insurance, including medical assistance, there should be a phone number on your insurance card that you can call to find out how to access services  The card may say, “For [address] or “For Drug and Alcohol Services” or “For Substance Abuse Services” call the number provided  Or call [place] Get Help Now at [address] [place] [address] Drug and Alcohol  For information on how to access Drug and Alcohol treatment services please contact:  After hours 24/7 number: [facility] at [phone]  During regular business hours call:   [facility] Free: [phone]   [address]: [phone]  [address] Outpatient: [phone]  [facility] [phone]  Confidential free help, from public health agencies, to find substance use treatment and information  [phone]    12 step Meetings    [facility] [phone]  [address] [place] [address] Local hotline: [phone]  AA meeting list can be found at [email or web address]  NA meeting list can be found at [email or web address]  pdf

## 2023-02-23 NOTE — ASSESSMENT & PLAN NOTE
· Patient with a history of chronic opioid use  · Last use was 2/22 around 1000  · Initiate COWS protocol with plan for eventual microinduction with Suboxone  · Currently experiencing mild opioid withdrawal s/sx of subjective chills/diaphoresis, diffuse myalgias/arthralgias, mild rhinorrhea/lacrimation  · Initiate Butrans 20 mcg/hr patch   · Continue monitoring opioid withdrawal, and plan for microinduction this AM (2/23) v  2/24 depending on pt's symptom severity and when willing to start Suboxone  · Symptomatic and supportive care  · Continuous pulse oximetry/telemetry monitoring
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
## 2023-02-24 LAB
EST. AVERAGE GLUCOSE BLD GHB EST-MCNC: 108 MG/DL
HBA1C MFR BLD: 5.4 %
HIV 1+2 AB+HIV1 P24 AG SERPL QL IA: NORMAL
HIV1 P24 AG SER QL: NORMAL

## 2023-02-24 RX ORDER — BUPRENORPHINE AND NALOXONE 2; .5 MG/1; MG/1
2 FILM, SOLUBLE BUCCAL; SUBLINGUAL
Status: ACTIVE | OUTPATIENT
Start: 2023-02-24 | End: 2023-02-25

## 2023-02-24 RX ORDER — BUPRENORPHINE 2 MG/1
0.5 TABLET SUBLINGUAL
Status: DISPENSED | OUTPATIENT
Start: 2023-02-24 | End: 2023-02-24

## 2023-02-24 RX ORDER — BUPRENORPHINE 2 MG/1
0.5 TABLET SUBLINGUAL ONCE
Status: COMPLETED | OUTPATIENT
Start: 2023-02-24 | End: 2023-02-24

## 2023-02-24 RX ADMIN — GABAPENTIN 300 MG: 300 CAPSULE ORAL at 11:21

## 2023-02-24 RX ADMIN — NICOTINE 1 PATCH: 14 PATCH, EXTENDED RELEASE TRANSDERMAL at 08:05

## 2023-02-24 RX ADMIN — CLONAZEPAM 1 MG: 1 TABLET ORAL at 13:28

## 2023-02-24 RX ADMIN — CLONAZEPAM 1 MG: 1 TABLET ORAL at 07:44

## 2023-02-24 RX ADMIN — Medication 0.5 MG: at 11:20

## 2023-02-24 RX ADMIN — MULTIPLE VITAMINS W/ MINERALS TAB 1 TABLET: TAB ORAL at 08:05

## 2023-02-24 NOTE — PLAN OF CARE
Problem: SUBSTANCE USE/ABUSE  Goal: By discharge, will develop insight into their chemical dependency and sustain motivation to continue in recovery  Description: INTERVENTIONS:  - Attends all daily group sessions and scheduled AA groups  - Actively practices coping skills through participation in the therapeutic community and adherence to program rules  - Reviews and completes assignments from individual treatment plan  - Assist patient development of understanding of their personal cycle of addiction and relapse triggers  Outcome: Progressing  Goal: By discharge, patient will have ongoing treatment plan addressing chemical dependency  Description: INTERVENTIONS:  - Assist patient with resources and/or appointments for ongoing recovery based living  Outcome: Progressing     Problem: PAIN - ADULT  Goal: Verbalizes/displays adequate comfort level or baseline comfort level  Description: Interventions:  - Encourage patient to monitor pain and request assistance  - Assess pain using appropriate pain scale  - Administer analgesics based on type and severity of pain and evaluate response  - Implement non-pharmacological measures as appropriate and evaluate response  - Consider cultural and social influences on pain and pain management  - Notify physician/advanced practitioner if interventions unsuccessful or patient reports new pain  Outcome: Progressing     Problem: INFECTION - ADULT  Goal: Absence or prevention of progression during hospitalization  Description: INTERVENTIONS:  - Assess and monitor for signs and symptoms of infection  - Monitor lab/diagnostic results  - Monitor all insertion sites, i e  indwelling lines, tubes, and drains  - Monitor endotracheal if appropriate and nasal secretions for changes in amount and color  - [place] appropriate cooling/warming therapies per order  - Administer medications as ordered  - Instruct and encourage patient and family to use good hand hygiene technique  - Identify and instruct in appropriate isolation precautions for identified infection/condition  Outcome: Progressing  Goal: Absence of fever/infection during neutropenic period  Description: INTERVENTIONS:  - Monitor WBC    Outcome: Progressing

## 2023-02-24 NOTE — ASSESSMENT & PLAN NOTE
· Patient with a history of chronic opioid use  · Last use was 2/22 around 1000  · Initiate COWS protocol with plan for eventual microinduction with Suboxone  · Currently experiencing mild opioid withdrawal s/sx of subjective chills/diaphoresis, diffuse myalgias/arthralgias, mild rhinorrhea/lacrimation  · Initiate Butrans 20 mcg/hr patch  · Initiate suboxone this AM with 0 5 mg dose   · Symptomatic and supportive care  · Continuous pulse oximetry/telemetry monitoring

## 2023-02-24 NOTE — ASSESSMENT & PLAN NOTE
· Pt reports daily use of methamphetamine  · Last use 2/22 around 1000  · No acute EKG changes or evidence of amphetamine-induced psychosis  · Supportive care  · Encourage cessation

## 2023-02-24 NOTE — PROGRESS NOTES
Progress Note - Medical Toxicology    Yoshi Bajwa 32 y o  female MRN: [id number]  Unit/Bed#: 5T DETOX 505-01 Encounter: [id number]  MEDICAL DETOX UNIT, LEVEL 4  Department of Medical Toxicology  Reason for Admission/Principal Problem: opioid detox and methamphetamine use  Rounding Provider: Seng Cesar MD, Sher Khan, DO         * Opioid withdrawal [facility])  Assessment & Plan  · Patient with a history of chronic opioid use  · Last use was 2/22 around 1000  · Initiate COWS protocol with plan for eventual microinduction with Suboxone  · Currently experiencing mild opioid withdrawal s/sx of subjective chills/diaphoresis, diffuse myalgias/arthralgias, mild rhinorrhea/lacrimation  · Initiate Butrans 20 mcg/hr patch  · Initiate suboxone this AM with 0 5 mg dose   · Symptomatic and supportive care  · Continuous pulse oximetry/telemetry monitoring    Opioid use disorder, severe, dependence ([facility] )  Assessment & Plan  · Patient with a history of chronic IV fentanyl use  · Uses 1 brick (50 bags) daily  · History of prior Suboxone use, short-term with Rx in 2021 and off the street   · Management of opioid withdrawal under MAT protocol as above  · Chronic Hepatitis studies pending  · Case management consulted for assistance with aftercare resources    Methamphetamine abuse [facility])  Assessment & Plan  · Pt reports daily use of methamphetamine  · Last use 2/22 around 1000  · No acute EKG changes or evidence of amphetamine-induced psychosis  · Supportive care  · Encourage cessation      Systolic murmur  Assessment & Plan  · Systolic murmur auscultated at L upper sternal border  · In the setting of IVDU  · Pt denies any known h/o heart murmur  · Pt afebrile, VSS, no evidence of active infection  · Echocardiogram unremarkable  · Continue monitoring VS, clinical status    Chronic hepatitis C without hepatic coma ([facility] )  Assessment & Plan  · Pt with a h/o chronic hepatitis C since 2018  · Treatment naive  · LFTs WNL on admission  · Obtain HCV genotype and RNA  · Recommend OP f/u with GI for continued mgmt          VTE Pharmacologic Prophylaxis:   Pharmacologic: Enoxaparin (Lovenox)  Mechanical VTE Prophylaxis in Place: no    Code Status: Level 1 - Full Code    Patient Centered Rounds: I have performed bedside rounds with nursing staff today  Discussions with Specialists or Other Care Team Provider: none   Education and Discussions with Family / Patient: with patient    Time Spent for Care: 30 minutes  More than 50% of total time spent on counseling and coordination of care as described above  Current Length of Stay: 2 day(s)    Current Patient Status: Inpatient     Certification Statement: The patient will continue to require additional inpatient hospital stay due to medical detox Discharge Plan: 24-48 hours      Total time spent today 30 minutes  Greater than 50% of total time was spent with the patient and / or family counseling and / or coordination of care  A description of the counseling / coordination of care: discussion with patient and education    Subjective:   Reports feeling withdrawal effects today and is feeling anxious and was unable to have a bowel movement earlier when she felt she needed to  She has no concerns or questions about the process, she is happy with her treatment so far and understands and is agreeable to suboxone initiation and is interested in outpatient services on leaving      Objective:     Clinical Opiate Withdrawal Scale  Pulse: 95  Resting Pulse Rate: Measured After Patient is Sitting or Lying for One Minute: Pulse rate 80 or below  GI Upset: Over Last Half Hour: No GI symptoms  Sweating: Over Past Half Hour Not Accounted for by Room Temperature of Patient Activity: No report of chills or flushing  Tremor: Observation of Outstretched Hands: No tremor  Restlessness: Observation During Assessment: Able to sit still  Yawning: Observation During Assessment: No yawning  Pupil Size: Pupils pinned or normal size for room light  Anxiety and Irritability: None  Bone or Joint Aches: If Patient was Having Pain Previously, Only the Additional Component Attributed to Opiate Withdrawal is Scored: Not present  Gooseflesh Skin: Skin is smooth  Runny Nose or Tearing: Not Accounted for by Cold Symptoms or Allergies: Not present  Clinical Opiate Withdrawal Scale Total Score: 0    No data recorded      Last 24 Hours Medication List:   Current Facility-Administered Medications   Medication Dose Route Frequency Provider Last Rate   • acetaminophen  650 mg Oral Q6H PRN Larisa Hernandez Dust, VIPUL     • aluminum-magnesium hydroxide-simethicone  30 mL Oral Q6H PRN Larisa Mary Dust, VIPUL     • buprenorphine  0 5 mg Sublingual Once Rachael Erazo MD     • clonazePAM  1 mg Oral Q6H PRN Kiya Green PA-C     • cloNIDine  0 1 mg Oral Q6H PRN Larisapatty Hernandez Dust, VIPUL     • enoxaparin  40 mg Subcutaneous Daily Kiya Green PA-C     • gabapentin  300 mg Oral Q8H PRN Kiya Green PA-C     • ibuprofen  600 mg Oral Q6H PRN Larisa Hernandez Dust, VIPUL     • multivitamin-minerals  1 tablet Oral Daily Larisa Hernandez Dust, VIPUL     • nicotine  1 patch Transdermal Daily Larisa Hernandez Dust, VIPUL     • ondansetron  4 mg Intravenous Q6H PRN Larisa Hernandez Dust, VIPUL     • transdermal buprenorphine  20 mcg Transdermal Q7 Days Larisa Mary Dust, VIPUL           Vitals:   Temp (24hrs), Av 3 °F (36 3 °C), Min:97 1 °F (36 2 °C), Max:97 7 °F (36 5 °C)    Temp:  [97 1 °F (36 2 °C)-97 7 °F (36 5 °C)] 97 1 °F (36 2 °C)  HR:  [66-95] 95  Resp:  [16-18] 16  BP: ()/(54-75) 113/75  SpO2:  [98 %-100 %] 100 %  Body mass index is 29 29 kg/m²  Input and Output Summary (last 24 hours):     Intake/Output Summary (Last 24 hours) at 2023 1106  Last data filed at 2023 1905  Gross per 24 hour   Intake 480 ml   Output --   Net 480 ml       Physical Exam:   Physical Exam  Vitals and nursing note reviewed  Constitutional:       General: She is not in acute distress  Appearance: Normal appearance  HENT:      Head: Normocephalic and atraumatic  Eyes:      Extraocular Movements: Extraocular movements intact  Pupils: Pupils are equal, round, and reactive to light  Cardiovascular:      Rate and Rhythm: Normal rate  Pulmonary:      Effort: No respiratory distress  Neurological:      Mental Status: She is alert  Psychiatric:         Mood and Affect: Mood normal          Behavior: Behavior normal          Additional Data:     Labs: keep all most recent labs as listed on admission templates   Results from last 7 days   Lab Units 02/23/23 0459 02/22/23 2032   WBC Thousand/uL 6 69 7 38   HEMOGLOBIN g/dL 11 9 11 7   HEMATOCRIT % 37 1 36 7   PLATELETS Thousands/uL 231 225   NEUTROS PCT %  --  56   LYMPHS PCT %  --  29   MONOS PCT %  --  10   EOS PCT %  --  4      Results from last 7 days   Lab Units 02/23/23 0459 02/22/23 2032   SODIUM mmol/L 139 139   POTASSIUM mmol/L 3 8 3 8   CHLORIDE mmol/L 106 106   CO2 mmol/L 25 27   BUN mg/dL 13 13   CREATININE mg/dL 0 66 0 75   ANION GAP mmol/L 8 6   CALCIUM mg/dL 9 4 9 7   ALBUMIN g/dL  --  4 3   TOTAL BILIRUBIN mg/dL  --  0 45   ALK PHOS U/L  --  81   ALT U/L  --  23   AST U/L  --  28   GLUCOSE RANDOM mg/dL 130* 109*                Results from last 7 days   Lab Units 02/23/23 0459   HEMOGLOBIN A1C % 5 4               * I Have Reviewed All Lab Data Listed Above  * Additional Pertinent Lab Tests Reviewed: [facility] Admission Reviewed      Imaging Studies: I have personally reviewed pertinent reports  Recent Cultures (last 7 days): Today, Patient Was Seen By: Luis Rothman MD    ** Please Note: Dictation voice to text software may have been used in the creation of this document   **

## 2023-02-24 NOTE — ASSESSMENT & PLAN NOTE
· Patient with a history of chronic IV fentanyl use  · Uses 1 brick (50 bags) daily  · History of prior Suboxone use, short-term with Rx in 2021 and off the street   · Management of opioid withdrawal under MAT protocol as above  · Chronic Hepatitis studies pending  · Case management consulted for assistance with aftercare resources

## 2023-02-24 NOTE — ASSESSMENT & PLAN NOTE
· Systolic murmur auscultated at L upper sternal border  · In the setting of IVDU  · Pt denies any known h/o heart murmur  · Pt afebrile, VSS, no evidence of active infection  · Echocardiogram unremarkable  · Continue monitoring VS, clinical status

## 2023-02-24 NOTE — PROGRESS NOTES
Pt refused 13:30 dose of Subutex 0 5mg  Pt requested to post pone micro induction until later in evening  Dr Tyra Scott notified, will continue to monitor

## 2023-02-25 VITALS
OXYGEN SATURATION: 99 % | WEIGHT: 176 LBS | TEMPERATURE: 98.9 F | RESPIRATION RATE: 18 BRPM | HEART RATE: 100 BPM | BODY MASS INDEX: 29.32 KG/M2 | HEIGHT: 65 IN | DIASTOLIC BLOOD PRESSURE: 77 MMHG | SYSTOLIC BLOOD PRESSURE: 161 MMHG

## 2023-02-25 PROBLEM — H01.001 BLEPHARITIS OF RIGHT UPPER EYELID: Status: ACTIVE | Noted: 2023-02-25

## 2023-02-25 PROBLEM — T14.8XXA WOUND OF SKIN: Status: ACTIVE | Noted: 2023-02-25

## 2023-02-25 PROBLEM — F11.93 OPIOID WITHDRAWAL (HCC): Status: RESOLVED | Noted: 2023-02-23 | Resolved: 2023-02-25

## 2023-02-25 RX ORDER — DOCUSATE SODIUM 100 MG/1
100 CAPSULE, LIQUID FILLED ORAL 2 TIMES DAILY PRN
Status: DISCONTINUED | OUTPATIENT
Start: 2023-02-25 | End: 2023-02-25 | Stop reason: HOSPADM

## 2023-02-25 RX ADMIN — MULTIPLE VITAMINS W/ MINERALS TAB 1 TABLET: TAB ORAL at 08:07

## 2023-02-25 RX ADMIN — NICOTINE 1 PATCH: 14 PATCH, EXTENDED RELEASE TRANSDERMAL at 08:07

## 2023-02-25 RX ADMIN — NALOXONE HYDROCHLORIDE 4 MG: 4 SPRAY NASAL at 12:14

## 2023-02-25 NOTE — NURSING NOTE
After showering, following order, attempt to remove butrans patch from right arm  butrans patch is not on right arm  Patient states she removed it an placed in the garbage  When attempting to locate patch, the nicotine patch was found, but the butrans patch  Looked in three garbage containers with scant amount of trash and was not located  Not found in the shower or on hospital clothes   Patch was removed but missing and unable to be placed in the medication wast bottle
Discharge instructions given both written and verbally to the patient  Personal items returned  IV d/c by me with catheter intact  Dressed in street clothes  Security to return other personal items to patient in the lobby after escorting her to the lobby  Patient to call a ride 
Pt refusing Subutex 0 5 mg and suboxone 2 mg stating it is too early to start  Patient stated when she took the Subutex earlier yesterday, it made her feel as if she was withdrawing   Patient agreed to start suboxone in the morning after 0800
Both arterial and venous

## 2023-02-25 NOTE — ASSESSMENT & PLAN NOTE
· Echocardiogram with trace valvular regurgitation without vegetations  · Low concern for endocarditis  · Patient strongly encouraged to discontinue intravenous drug use and counseled on risk of bacteremia and endocarditis

## 2023-02-25 NOTE — CASE MANAGEMENT
Case Management Discharge Planning Note    Patient name Pedro Pérez  Location 5T DETOX 505/5T DETOX 50* MRN [id number]  : 1995 Date 2023       Current Admission Date: 2023  Current Admission Diagnosis:Opioid withdrawal [facility])   Patient Active Problem List    Diagnosis Date Noted   • Opioid withdrawal ([facility] ) 2023   • Opioid use disorder, severe, dependence ([facility] ) 2023   • Overweight (BMI 25 0-29 9) 2023   • Tobacco use disorder 2023   • Chronic hepatitis C without hepatic coma ([facility] ) 26/10/3903   • Systolic murmur    • Mood disorder ([facility] ) 2017   • Opioid abuse ([facility] ) 2017   • Benzodiazepine abuse ([facility] ) 2017   • Methamphetamine abuse ([facility] ) 2017      LOS (days): 3  Geometric Mean LOS (GMLOS) (days):   Days to GMLOS:     OBJECTIVE:  Risk of Unplanned Readmission Score: 6 99         Current admission status: Inpatient   Preferred Pharmacy:   PATIENT/FAMILY REPORTS NO PREFERRED PHARMACY  No address on file      Primary Care Provider: No primary care provider on file  Primary Insurance: [facility]  Secondary Insurance:     DISCHARGE DETAILS: CM met with pt to review plan for discharge; pt reports that she will follow up with her PCP and CMP D&A for MAT/outpatient substance use treatment  Pt stated that she will arrange her own transportation home  Pt escorted off unit, discharge to home       Discharge planning discussed with[de-identified] Patient  Freedom of Choice: Yes                   Contacts  Patient Contacts: CMP D&A  Relationship to Patient[de-identified] Treatment Provider  Contact Method: Phone  Phone Number: [phone]  Reason/Outcome: Continuity of Care, Referral, Discharge Planning              Other Referral/Resources/Interventions Provided:  Referrals Provided[de-identified] Crisis Hotline, IOP, Peer Specialist, Psychiatrist, Support Group, Therapist (inpatient substance use treatment)    Would you like to participate in our [address] service program?  : No - Declined                                              Family notified[de-identified] Pt declined to sign ROIs for supportive contacts

## 2023-02-25 NOTE — ASSESSMENT & PLAN NOTE
· Body mass index is 29 29 kg/m²     · Recommend healthy diet, lifestyle modifications, cessation of IVDU

## 2023-02-25 NOTE — CASE MANAGEMENT
CM received call from someone named "Seble Murcia" requesting to speak with pt regarding discharge; CM informed pt of this who requested to speak with "olive"; CM transferred phone call into pt's room

## 2023-02-25 NOTE — ASSESSMENT & PLAN NOTE
· Patient with 1 cm by 3 cm wound to lower back  · Good base of granulation tissue with heaped pink skin edges  · No evidence of surrounding cellulitis  · Counseled on local wound care instructions

## 2023-02-25 NOTE — DISCHARGE SUMMARY
MEDICAL DETOX UNIT, LEVEL 4  Department of Medical Toxicology  Reason for Admission/Principal Problem: Opioid withdrawal, opioid use disorder  Admitting provider: MD Gina Zimmerman Bev Hammer*   2/22/2023  6:40 PM     Discharging Physician / Practitioner: Radha Dunbar MD  PCP: No primary care provider on file    Admission Date:   Admission Orders (From admission, onward)     Ordered        02/22/23 2116  INPATIENT ADMISSION  Once                      Discharge Date: 02/25/23    Medical Problems     Resolved Problems  Date Reviewed: 2/24/2023          Resolved    Opioid withdrawal ([facility] ) 2/25/2023     Resolved by  Radha Dunbar MD          * Opioid use disorder, severe, dependence ([facility] )  Assessment & Plan  · Patient requesting discharge today  · Denies withdrawal symptoms  · Would like to wait until her follow up appointment on Monday to start Suboxone  · Will provide naloxone upon discharge  · Patient strongly encouraged to discontinue intravenous drug use given risks of infection and endocarditis  · Patient informed of negative rapid HIV results; hepatitis C results pending  · Referred for outpatient follow up and referral to GI if needed    Systolic murmur  Assessment & Plan  · Echocardiogram with trace valvular regurgitation without vegetations  · Low concern for endocarditis  · Patient strongly encouraged to discontinue intravenous drug use and counseled on risk of bacteremia and endocarditis    Methamphetamine abuse ([facility] )  Assessment & Plan  · Encouraged cessation  · Outpatient drug and alcohol follow up      Chronic hepatitis C without hepatic coma ([facility] )  Assessment & Plan  · RNA pending  · Encouraged outpatient PCP follow up and referral to GI if needed    Blepharitis of right upper eyelid  Assessment & Plan  · Patient endorses recurrent right eyelid styes/redness with spontaneous drainage over last several weeks  · Today with evidence of blepharitis, no stye visualized  · Counseled on warm compresses, lid scrubs twice daily, good hand hygiene, and avoidance of makeup  Encouraged to disinfect makeup brushes and products and to throw away mascara  · Will follow up with PCP on Monday  · Returns precautions discussed    Tobacco use disorder  Assessment & Plan  · Encouraged cessation  · Nicotine replacement therapy offered      Overweight (BMI 25 0-29  9)  Assessment & Plan  · Body mass index is 29 29 kg/m²  · Recommend healthy diet, lifestyle modifications, cessation of IVDU      Wound of skin  Assessment & Plan  · Patient with 1 cm by 3 cm wound to lower back  · Good base of granulation tissue with heaped pink skin edges  · No evidence of surrounding cellulitis  · Counseled on local wound care instructions    Opioid withdrawal ([facility])-resolved as of 2/25/2023  Assessment & Plan  · Patient denies significant symptoms today; feels her withdrawal is over  · Declines further Suboxone induction or treatment      Consultations During Hospital Stay:  · Case Management     Procedures Performed:   · None    Significant Findings / Test Results:   · ECHO- EF 65%  Trace mitral, tricuspid, and pulmonic valve regurgitation     Incidental Findings:   · None    Test Results Pending at Discharge (will require follow up): · Hepatitis C RNA     Outpatient Tests/Follow-ups Requested:  · PCP follow up  · Opioid agonist therapy follow up  · Drug/alcohol follow up    Complications:  None    Reason for Admission: Opioid withdrawal, opioid use disorder     Hospital Course:     Cele Jin is a 32 y o  female patient who originally presented to the hospital on 2/22/2023 due to opioid withdrawal   Patient initially presented to the [facility] ED requesting detoxification from opioids and agreed to initiation of Suboxone  Pt was subsequently admitted to the [address] [place] [address] Detox Unit for medically assisted opioid withdrawal and induction with Suboxone    On admission, she was placed on Butrans patch 20 mcg to slowly introduce Suboxone and prevent precipitated withdrawal   On 2/24/23, patient was given an initation Suboxone does of 0 5 mg but refused continuation of micro-induction due to worsening of symptoms  On day of discharge, patient refused additional Suboxone, stating she would like to initiate Suboxone in the outpatient setting  She felt it was started too early yesterday and would like to continue her "detox" at home  States she feels she will not have cravings for opioids over the next 2 days  Plans to follow up on Monday with her PCP who will also provide her with Suboxone for opioid agonist therapy  Patient is denying withdrawal symptoms on day of discharge  During this admission, patient was found to have a new systolic heart murmur  She underwent an echocardiogram which shows an EF of 65% and trace regurgitation on her mitral, tricuspid, and pulmonic valves but no acute pathology requiring treatment  Patient counseled on cession of intravenous drug use given risk of endocarditis  Patient has evidence of blepharitis on day of discharge and counseled on supportive management at home  Will follow up with PCP  Patient also counseled on wound care for lower back chronic wound  Case management was consulted for assistance with aftercare resources, and patient will be discharged to home with outpatient follow up  Please see above list of diagnoses and related plan for additional information  Condition at Discharge: good     Discharge Day Visit / Exam:     Subjective:  Patient states she feels well today  Endorses right eyelid pain/swelling  Tolerating PO intake and ambulating without difficulty    Vitals: Blood Pressure: 161/77 (02/25/23 1057)  Pulse: 100 (02/25/23 1057)  Temperature: 98 9 °F (37 2 °C) (02/25/23 0710)  Temp Source: Temporal (02/25/23 0710)  Respirations: 18 (02/25/23 1057)  Height: 5' 5" (165 1 cm) (02/23/23 0941)  Weight - Scale: 79 8 kg (176 lb) (02/23/23 0941)  SpO2: 99 % (02/25/23 2313)  Exam:   Physical Exam  Vitals and nursing note reviewed  Constitutional:       General: She is not in acute distress  Appearance: She is not ill-appearing, toxic-appearing or diaphoretic  HENT:      Head: Normocephalic and atraumatic  Nose: Nose normal       Mouth/Throat:      Mouth: Mucous membranes are moist    Eyes:      General: No scleral icterus  Right eye: No discharge  Left eye: No discharge  Extraocular Movements: Extraocular movements intact  Conjunctiva/sclera: Conjunctivae normal       Pupils: Pupils are equal, round, and reactive to light  Comments: Right upper eyelid with erythema and swelling, greatest over medial aspect, with tenderness to palpation  No purulent discharge noted   Cardiovascular:      Rate and Rhythm: Normal rate and regular rhythm  Pulmonary:      Effort: Pulmonary effort is normal  No respiratory distress  Breath sounds: No wheezing, rhonchi or rales  Abdominal:      General: There is no distension  Palpations: Abdomen is soft  Tenderness: There is no abdominal tenderness  There is no guarding or rebound  Musculoskeletal:         General: No swelling, tenderness or deformity  Cervical back: Neck supple  Skin:     General: Skin is warm and dry  Comments: 1 cm by 3 cm wound to lower back with good base of granulation tissue and rolled, heaped pink edges without surrounding erythema or warmth   Neurological:      General: No focal deficit present  Mental Status: She is alert and oriented to person, place, and time  Psychiatric:         Mood and Affect: Mood normal          Behavior: Behavior normal        Discussion with Family: Spoke with patient regarding treatment plan     Discharge instructions/Information to patient and family:   See after visit summary for information provided to patient and family        Provisions for Follow-Up Care:  See after visit summary for information related to follow-up care and any pertinent home health orders  Disposition:     Home    For Discharges to [facility]:   · Not Applicable to this Patient - Not Applicable to this Patient    Planned Readmission: None     Discharge Statement:  I spent 60 minutes discharging the patient  This time was spent on the day of discharge  I had direct contact with the patient on the day of discharge  Greater than 50% of the total time was spent examining patient, answering all patient questions, arranging and discussing plan of care with patient as well as directly providing post-discharge instructions  Additional time then spent on discharge activities  Discharge Medications:  See after visit summary for reconciled discharge medications provided to patient and family        ** Please Note: This note has been constructed using a voice recognition system **

## 2023-02-25 NOTE — DISCHARGE INSTRUCTIONS
Please follow up with your doctor regarding your hepatitis C results and referral to treatment  Please follow up with your doctor regarding frequent blepharitis/styes

## 2023-02-25 NOTE — ASSESSMENT & PLAN NOTE
· Patient requesting discharge today  · Denies withdrawal symptoms  · Would like to wait until her follow up appointment on Monday to start Suboxone  · Will provide naloxone upon discharge  · Patient strongly encouraged to discontinue intravenous drug use given risks of infection and endocarditis  · Patient informed of negative rapid HIV results; hepatitis C results pending  · Referred for outpatient follow up and referral to GI if needed

## 2023-02-25 NOTE — ASSESSMENT & PLAN NOTE
· Patient endorses recurrent right eyelid styes/redness with spontaneous drainage over last several weeks  · Today with evidence of blepharitis, no stye visualized  · Counseled on warm compresses, lid scrubs twice daily, good hand hygiene, and avoidance of makeup   Encouraged to disinfect makeup brushes and products and to throw away mascara  · Will follow up with PCP on Monday  · Returns precautions discussed

## 2023-02-25 NOTE — ASSESSMENT & PLAN NOTE
· Patient denies significant symptoms today; feels her withdrawal is over  · Declines further Suboxone induction or treatment

## 2023-02-27 ENCOUNTER — OFFICE VISIT (OUTPATIENT)
Dept: INTERNAL MEDICINE CLINIC | Facility: CLINIC | Age: 28
End: 2023-02-27

## 2023-02-27 VITALS
WEIGHT: 178 LBS | OXYGEN SATURATION: 94 % | BODY MASS INDEX: 29.66 KG/M2 | TEMPERATURE: 98.1 F | SYSTOLIC BLOOD PRESSURE: 110 MMHG | HEIGHT: 65 IN | HEART RATE: 84 BPM | DIASTOLIC BLOOD PRESSURE: 72 MMHG

## 2023-02-27 DIAGNOSIS — Z51.81 ENCOUNTER FOR MONITORING SUBOXONE MAINTENANCE THERAPY: ICD-10-CM

## 2023-02-27 DIAGNOSIS — F19.10 POLYSUBSTANCE ABUSE (HCC): Primary | ICD-10-CM

## 2023-02-27 DIAGNOSIS — Z79.899 ENCOUNTER FOR MONITORING SUBOXONE MAINTENANCE THERAPY: ICD-10-CM

## 2023-02-27 DIAGNOSIS — Z51.81 ENCOUNTER FOR THERAPEUTIC DRUG LEVEL MONITORING: ICD-10-CM

## 2023-02-27 PROBLEM — T14.8XXA WOUND OF SKIN: Status: RESOLVED | Noted: 2023-02-25 | Resolved: 2023-02-27

## 2023-02-27 PROBLEM — H01.001 BLEPHARITIS OF RIGHT UPPER EYELID: Status: RESOLVED | Noted: 2023-02-25 | Resolved: 2023-02-27

## 2023-02-27 PROBLEM — F11.20 OPIOID USE DISORDER, SEVERE, DEPENDENCE (HCC): Status: RESOLVED | Noted: 2023-02-23 | Resolved: 2023-02-27

## 2023-02-27 PROBLEM — F31.9 BIPOLAR 1 DISORDER (HCC): Status: ACTIVE | Noted: 2023-02-27

## 2023-02-27 LAB
HCV RNA SERPL NAA+PROBE-ACNC: NORMAL IU/ML
TEST INFORMATION: NORMAL

## 2023-02-27 RX ORDER — NALOXONE HYDROCHLORIDE 4 MG/.1ML
1 SPRAY NASAL AS NEEDED
Qty: 1 EACH | Refills: 1 | Status: SHIPPED | OUTPATIENT
Start: 2023-02-27

## 2023-02-27 RX ORDER — BUPRENORPHINE AND NALOXONE 8; 2 MG/1; MG/1
FILM, SOLUBLE BUCCAL; SUBLINGUAL
Qty: 2 FILM | Refills: 0 | Status: SHIPPED | OUTPATIENT
Start: 2023-02-27 | End: 2023-02-27

## 2023-02-27 RX ORDER — BUPRENORPHINE AND NALOXONE 8; 2 MG/1; MG/1
FILM, SOLUBLE BUCCAL; SUBLINGUAL
Qty: 14 FILM | Refills: 0 | Status: SHIPPED | OUTPATIENT
Start: 2023-02-27

## 2023-02-27 NOTE — UTILIZATION REVIEW
NOTIFICATION OF ADMISSION DISCHARGE   This is a Notification of Discharge from [facility]  Please be advised that this patient has been discharge from our facility  Below you will find the admission and discharge date and time including the patient’s disposition  UTILIZATION REVIEW CONTACT:  Ashley iKng MA  Utilization   Network Utilization Review Department  Phone: [phone] x carefully listen to the prompts  All voicemails are confidential   Email: [email or web address]     ADMISSION INFORMATION  PRESENTATION DATE: 2/22/2023  6:40 PM  OBERVATION ADMISSION DATE:   INPATIENT ADMISSION DATE: 2/22/23  9:16 PM   DISCHARGE DATE: 2/25/2023 12:45 PM   DISPOSITION:Home/Self Care    IMPORTANT INFORMATION:  Send all requests for admission clinical reviews, approved or denied determinations and any other requests to dedicated fax number below belonging to the campus where the patient is receiving treatment   List of dedicated fax numbers:  [address] DENIALS (Administrative/Medical Necessity) [phone]   [address] (Maternity/NICU/Pediatrics) [phone]   [facility] [phone]   [place] [address] [phone]   [address] [phone]   [address] [facility] [phone] 90 [facility] [phone]   [address] [place] [address] 119 [phone]   [facility]  [phone]   [facility] [phone]   [facility] [phone]

## 2023-02-27 NOTE — PATIENT INSTRUCTIONS
Naloxone (Into the nose)   Naloxone Hydrochloride (nal-OX-one maria elena-droe-KLOR-michael)  Treats opioid overdose in an emergency situation  Must be given as soon as possible  Brand Name(s): Kloxaudie Narcrolanda   There may be other brand names for this medicine  When This Medicine Should Not Be Used: This medicine is not right for everyone  Do not use it if you had an allergic reaction to naloxone  How to Use This Medicine:   Spray  Your doctor will tell you how much medicine to use  Do not use more than directed  Your doctor or a pharmacist can tell you where to buy this medicine  It is available without a doctor's order at most major pharmacies  This medicine must be given to you (the patient) by someone else  Talk with people close to you so they know what to do in case of an emergency  Read and follow the patient instructions that come with this medicine  Talk to your doctor or pharmacist if you have any questions  This medicine is for use only in the nose  Do not get any of it in your eyes or on your skin  If it does get on these areas, rinse it off right away  To use:   Each nasal spray contains only one dose of naloxone  Do not prime or test the nasal spray  Hold the nasal spray with your thumb on the bottom of the plunger and your first and middle fingers on either side of the nozzle  Chary Null the patient on his or her back  Support the patient's neck with your hand and let the head tilt back  Gently insert the tip of the nozzle into one nostril, until your fingers on either side of the nozzle are against the bottom of the patient's nose  Press the plunger firmly to give the dose  Remove the nasal spray from the patient's nose  Move the patient on his or her side (recovery position)  Get emergency medical help right away  Watch the patient closely  If needed, you may give more doses every 2 to 3 minutes until the patient responds   Use a new nasal spray for each dose and spray the medicine into the other nostril each time  Store the medicine in a closed container at room temperature, away from heat, moisture, and direct light  Do not freeze or expose to excessive heat  If the spray is frozen and is needed in an emergency, do not wait for the spray to thaw  Get medical help right away  However, the spray may be thawed for 15 minutes in room temperature, and it can still be used if it has been thawed after being frozen before  Ask your pharmacist about the best way to dispose of medicine that you do not use  Drugs and Foods to Avoid:      Ask your doctor or pharmacist before using any other medicine, including over-the-counter medicines, vitamins, and herbal products  Warnings While Using This Medicine:   Tell your doctor if you are pregnant or breastfeeding, or if you have heart or blood vessel disease  This medicine should be given right away after a suspected or known overdose of an opioid (narcotic) medicine  This will help prevent serious breathing problems and severe sleepiness that can lead to death  The effects of the opioid medicine may last longer than the effects of the naloxone  This means the breathing problems and sleepiness could come back  Always call for emergency help after the first dose of naloxone  This medicine could cause withdrawal symptoms from the opioid medicine  Keep all medicine out of the reach of children  Never share your medicine with anyone  Possible Side Effects While Using This Medicine:   Call your doctor right away if you notice any of these side effects:   Allergic reaction: Itching or hives, swelling in your face or hands, swelling or tingling in your mouth or throat, chest tightness, trouble breathing  Crying more than usual (in babies)  Diarrhea, nausea, vomiting, stomach cramps or pain  Fast, pounding, or uneven heartbeat  Fever, runny nose, sneezing, sweating, yawning, discomfort in the nose  Lightheadedness, dizziness, fainting  Ongoing trouble breathing  Seizure, shaking, or feeling restless, nervous, or irritable  Unusual tiredness or weakness  If you notice these less serious side effects, talk with your doctor:   Headache  Joint or muscle pain  If you notice other side effects that you think are caused by this medicine, tell your doctor  Call your doctor for medical advice about side effects  You may report side effects to FDA at 0-775-FDA-6731  © Copyright [facility] 2022 Information is for End User's use only and may not be sold, redistributed or otherwise used for commercial purposes  The above information is an  only  It is not intended as medical advice for individual conditions or treatments  Talk to your doctor, nurse or pharmacist before following any medical regimen to see if it is safe and effective for you  Buprenorphine/Naloxone (Into the mouth)   Buprenorphine (bue-pre-NOR-feen), Naloxone (nal-OX-one)  Treats narcotic dependence  Brand Name(s): Suboxone, Zubsolv   There may be other brand names for this medicine  When This Medicine Should Not Be Used: This medicine is not right for everyone  Do not use it if you had an allergic reaction to buprenorphine or naloxone  How to Use This Medicine: Thin Sheet, Tablet  Take your medicine as directed  Your dose may need to be changed several times to find what works best for you  You must let the medicine dissolve  Never swallow the film or tablet  Your body may not absorb enough of the medicine if you swallow it  Your health caregiver should show you how to use the medicine  If you do not understand, ask for help  It is important to use the medicine correctly  Do not talk while the medicine is inside your mouth  Buccal film: Rinse your mouth with water to moisten it  Place the film against the inside of your cheek  If your doctor told you to use more than 1 film, place the second film inside your other cheek  Do not place more than 2 films inside of 1 cheek at a time  Do not move or touch the film  Do not eat or drink anything until the film is completely dissolved  Sublingual tablet: Place the tablet under your tongue  If your doctor told you to use more than 1 tablet, place all of the tablets in different places under your tongue at the same time  You can use 2 tablets at a time until you have taken all of the medicine, if that is easier for you  Let the tablets dissolve completely in your mouth  Do not eat or drink anything until the tablets are completely dissolved  Rinse your mouth with water and swallow  Wait for at least one hour before brushing your teeth  Sublingual film: Drink some water to help moisten your mouth  Place the film under your tongue  If your doctor told you to use more than 1 film, place the second film on the opposite side from the first one  Do not move the film after you placed it under your tongue  If you are supposed to use more than 2 films, use them the same way, but do not start until the first 2 films are completely dissolved  Do not eat or drink anything until the film is completely dissolved  Do not break, crush, chew, or cut the film or tablet  This medicine should come with a Medication Guide  Ask your pharmacist for a copy if you do not have one  Missed dose: Take a dose as soon as you remember  If it is almost time for your next dose, wait until then and take a regular dose  Do not take extra medicine to make up for a missed dose  Store the medicine in a closed container at room temperature, away from heat, moisture, and direct light  Drop off any unused narcotic medicine at a drug take-back location right away  If you do not have a drug take-back location near you, flush any unused narcotic medicine down the toilet  Check your local drug store and clinics for take-back locations  You can also check the [employer] web site for locations   Here is the link to the FDA safe disposal of medicines [employer] ee  Drugs and Foods to Avoid:   Ask your doctor or pharmacist before using any other medicine, including over-the-counter medicines, vitamins, and herbal products  Do not use this medicine if you are using or have used an MAO inhibitor within the past 14 days  Some medicines can affect how buprenorphine/naloxone works  Tell your doctor if you are using the following:   Carbamazepine, cyclobenzaprine, erythromycin, ketoconazole, metaxalone, mirtazapine, phenobarbital, phenytoin, rifampin, tramadol, trazodone  Diuretic (water pill)  Medicine to treat depression or mental health problems (including SNRIs, SSRIs, TCAs)  Medicine to treat HIV/AIDS (including atazanavir, delavirdine, efavirenz, etravirine, nevirapine, ritonavir)  Phenothiazine medicine  Triptan medicine to treat migraine headaches  Do not drink alcohol while you are using this medicine  Tell your doctor if you use anything else that makes you sleepy  Some examples are allergy medicine, narcotic pain medicine, and alcohol  Tell your doctor if you are also using butorphanol, nalbuphine, pentazocine, or a muscle relaxer  Warnings While Using This Medicine:   Tell your doctor if you are pregnant or breastfeeding, or if you have kidney disease, liver disease (including hepatitis), lung or breathing problems (including sleep apnea), tooth problems (including history of cavities), adrenal gland problems, an enlarged prostate, trouble urinating, gallbladder problems, thyroid problems, stomach problems, or a history of depression  Tell your doctor if you have heart disease, congestive heart failure, a slow heartbeat, or a history of heart rhythm problems (including long QT syndrome)  Tell your doctor if you have a brain tumor, head injury, or alcohol or drug abuse    This medicine may cause the following problems:  High risk of overdose, which can lead to death  Respiratory depression (serious breathing problem that can be life-threatening)  Adrenal gland problems  Sleep-related breathing problems (including sleep apnea, sleep-related hypoxemia)  Low blood pressure  Liver problems  QT prolongation (heart rhythm problem)  Tooth problems (including tooth fracture, tooth loss)  Serotonin syndrome, when used with certain medicines  This medicine may make you dizzy or drowsy  Do not drive or do anything else that could be dangerous until you know how this medicine affects you  Sit or lie down if you feel dizzy  Stand up carefully  Tell any doctor or dentist who treats you that you are using this medicine  This medicine can be habit-forming  Do not use more than your prescribed dose  Call your doctor if you think your medicine is not working  This medicine may cause constipation, especially with long-term use  Ask your doctor if you should use a laxative to prevent and treat constipation  Do not stop using this medicine suddenly  Your doctor will need to slowly decrease your dose before you stop it completely  This medicine could cause infertility  Talk with your doctor before using this medicine if you plan to have children  Your doctor will do lab tests at regular visits to check on the effects of this medicine  Keep all appointments  Keep all medicine out of the reach of children  Never share your medicine with anyone  Possible Side Effects While Using This Medicine:   Call your doctor right away if you notice any of these side effects:   Allergic reaction: Itching or hives, swelling in your face or hands, swelling or tingling in your mouth or throat, chest tightness, trouble breathing  Anxiety, restlessness, fast heartbeat, fever, muscle spasms, twitching, diarrhea, seeing or hearing things that are not there  Blue lips, fingernails, or skin, trouble breathing  Changes in skin color, dark freckles, cold feeling, tiredness, weight loss  Dark urine or pale stools, nausea, vomiting, loss of appetite, stomach pain, yellow skin or eyes  Extreme dizziness, drowsiness, or weakness, slow heartbeat, sweating, seizures, cold or clammy skin  Fast, pounding, or uneven heartbeat  Severe confusion, lightheadedness, dizziness, or fainting  Trouble breathing or slow breathing  Toothache  If you notice these less serious side effects, talk with your doctor:   Constipation, diarrhea, nausea, vomiting, stomach upset  Headache  Stuffy or runny nose  If you notice other side effects that you think are caused by this medicine, tell your doctor  Call your doctor for medical advice about side effects  You may report side effects to FDA at 4-923-FDA-2045  © Copyright Chaya Harder 2022 Information is for End User's use only and may not be sold, redistributed or otherwise used for commercial purposes  The above information is an  only  It is not intended as medical advice for individual conditions or treatments  Talk to your doctor, nurse or pharmacist before following any medical regimen to see if it is safe and effective for you

## 2023-02-27 NOTE — PROGRESS NOTES
BMI Counseling: Body mass index is 29 62 kg/m²  The BMI is above normal  Nutrition recommendations include decreasing portion sizes and encouraging healthy choices of fruits and vegetables  Exercise recommendations include moderate physical activity 150 minutes/week  No pharmacotherapy was ordered  Rationale for BMI follow-up plan is due to patient being overweight or obese       Assessment/Plan:  Problem List Items Addressed This Visit    None  Visit Diagnoses     Polysubstance abuse ([facility] [address] )    -  Primary    Relevant Medications    naloxone (Narcan) 4 mg/0 1 mL nasal spray    buprenorphine-naloxone (Suboxone) 8-2 mg    Other Relevant Orders    [facility] All Prescribed Meds    Amphetamine    Alprazolam    Clonazepam    Diazepam    Lorazepam    Oxazepam    Temazepam    Gabapentin    Pregabalin    Cocaine    Heroin    Buprenorphine    Levorphanol    Meperidine    Naltrexone    Fentanyl    Methadone    Oxycodone    Oxymorphone    Tapentadol    Tramadol    THC    Codeine, Hydrocodone, Hydromorphone, Morphine    Methylphenidate    Encounter for monitoring Suboxone maintenance therapy        Relevant Medications    naloxone (Narcan) 4 mg/0 1 mL nasal spray    buprenorphine-naloxone (Suboxone) 8-2 mg    Other Relevant Orders    [facility] All Prescribed Meds    Amphetamine    Alprazolam    Clonazepam    Diazepam    Lorazepam    Oxazepam    Temazepam    Gabapentin    Pregabalin    Cocaine    Heroin    Buprenorphine    Levorphanol    Meperidine    Naltrexone    Fentanyl    Methadone    Oxycodone    Oxymorphone    Tapentadol    Tramadol    THC    Codeine, Hydrocodone, Hydromorphone, Morphine    Methylphenidate    Encounter for therapeutic drug level monitoring        Relevant Medications    naloxone (Narcan) 4 mg/0 1 mL nasal spray    buprenorphine-naloxone (Suboxone) 8-2 mg    Other Relevant Orders    [facility] All Prescribed Meds    Amphetamine    Alprazolam    Clonazepam    Diazepam    Lorazepam    Oxazepam    Temazepam Gabapentin    Pregabalin    Cocaine    Heroin    Buprenorphine    Levorphanol    Meperidine    Naltrexone    Fentanyl    Methadone    Oxycodone    Oxymorphone    Tapentadol    Tramadol    THC    Codeine, Hydrocodone, Hydromorphone, Morphine    Methylphenidate           Diagnoses and all orders for this visit:    Polysubstance abuse ([address] )  -     [facility] All Prescribed Meds  -     Amphetamine  -     Alprazolam  -     Clonazepam  -     Diazepam  -     Lorazepam  -     Oxazepam  -     Temazepam  -     Gabapentin  -     Pregabalin  -     Cocaine  -     Heroin  -     Buprenorphine  -     Levorphanol  -     Meperidine  -     Naltrexone  -     Fentanyl  -     Methadone  -     Oxycodone  -     Oxymorphone  -     Tapentadol  -     Tramadol  -     THC  -     Codeine, Hydrocodone, Hydromorphone, Morphine  -     Methylphenidate  -     naloxone (Narcan) 4 mg/0 1 mL nasal spray; 0 1 mL (4 mg total) into each nostril as needed (respiratory depression or possible OD)  -     Discontinue: buprenorphine-naloxone (Suboxone) 8-2 mg; Return to the office with the med for dosing   -     buprenorphine-naloxone (Suboxone) 8-2 mg; One or two strips sl q day      Encounter for monitoring Suboxone maintenance therapy  -     [facility] All Prescribed Meds  -     Amphetamine  -     Alprazolam  -     Clonazepam  -     Diazepam  -     Lorazepam  -     Oxazepam  -     Temazepam  -     Gabapentin  -     Pregabalin  -     Cocaine  -     Heroin  -     Buprenorphine  -     Levorphanol  -     Meperidine  -     Naltrexone  -     Fentanyl  -     Methadone  -     Oxycodone  -     Oxymorphone  -     Tapentadol  -     Tramadol  -     THC  -     Codeine, Hydrocodone, Hydromorphone, Morphine  -     Methylphenidate  -     naloxone (Narcan) 4 mg/0 1 mL nasal spray; 0 1 mL (4 mg total) into each nostril as needed (respiratory depression or possible OD)  -     Discontinue: buprenorphine-naloxone (Suboxone) 8-2 mg; Return to the office with the med for dosing   - buprenorphine-naloxone (Suboxone) 8-2 mg; One or two strips sl q day  Encounter for therapeutic drug level monitoring  -     [facility] All Prescribed Meds  -     Amphetamine  -     Alprazolam  -     Clonazepam  -     Diazepam  -     Lorazepam  -     Oxazepam  -     Temazepam  -     Gabapentin  -     Pregabalin  -     Cocaine  -     Heroin  -     Buprenorphine  -     Levorphanol  -     Meperidine  -     Naltrexone  -     Fentanyl  -     Methadone  -     Oxycodone  -     Oxymorphone  -     Tapentadol  -     Tramadol  -     THC  -     Codeine, Hydrocodone, Hydromorphone, Morphine  -     Methylphenidate  -     naloxone (Narcan) 4 mg/0 1 mL nasal spray; 0 1 mL (4 mg total) into each nostril as needed (respiratory depression or possible OD)  -     Discontinue: buprenorphine-naloxone (Suboxone) 8-2 mg; Return to the office with the med for dosing   -     buprenorphine-naloxone (Suboxone) 8-2 mg; One or two strips sl q day  No problem-specific Assessment & Plan notes found for this encounter  A/P: Pt RTC with the med and was dosed  After about 30 minutes, started to feel better and no side effects  Will be d/c'd to home on 16mg films, dose 1/6 and keep f/u for counseling  Drug screen obtained  Narcan script given  Reminded to keep meds safe and out of reach of children  RTC one week for f/u  Subjective:      Patient ID: Christos Napier is a 32 y o  female  WF w/o any PCP presents for her initial suboxone  Pt reports abusing heroin at age 22 nasally  Within a year, had switched to IV abuse  By 20, was adding meth to the heroin  Over the next 7 sever years, continued abuse maxing out at 1-2 bricks/day  Several accidental OD's  Multiple failed detox stays  One incarceration and is currently on parole  Entered [address] [place] [address] rehab 2/21 and d/c 2/25  Has not used since and is going to [facility] counseling  Denies being pregnant or breastfeeding         The following portions of the patient's history were reviewed and updated as appropriate:   She has a past medical history of Anxiety, Asthma, Chronic pain disorder, Depression, Heart murmur (02/25/2023), Hepatitis C, Scoliosis, Scoliosis, Self-injurious behavior, Substance abuse ([facility] [address] ), [facility] attempt ([facility] ), and Withdrawal symptoms, drug or narcotic ([facility] )  ,  does not have any pertinent problems on file  ,   has a past surgical history that includes Dilation and evacuation  ,  family history includes Asthma in her brother; Substance Abuse in her brother; Thyroid disease in her mother  ,   reports that she has been smoking cigarettes  She has a 7 50 pack-year smoking history  She does not have any smokeless tobacco history on file  She reports current drug use  Drugs: Heroin, Benzodiazepines, and Methamphetamines  She reports that she does not drink alcohol ,  has No Known Allergies     Current Outpatient Medications   Medication Sig Dispense Refill   • buprenorphine-naloxone (Suboxone) 8-2 mg One or two strips sl q day  14 Film 0   • naloxone (Narcan) 4 mg/0 1 mL nasal spray 0 1 mL (4 mg total) into each nostril as needed (respiratory depression or possible OD) 1 each 1     No current facility-administered medications for this visit  Review of Systems   Constitutional: Positive for activity change, chills and fatigue  Negative for diaphoresis and fever  HENT: Positive for postnasal drip and rhinorrhea  Negative for congestion, ear discharge, ear pain, sinus pressure, sinus pain and sore throat  Eyes: Negative for visual disturbance  Respiratory: Negative for cough, chest tightness, shortness of breath and wheezing  Cardiovascular: Positive for palpitations  Negative for chest pain and leg swelling  Gastrointestinal: Positive for abdominal pain and diarrhea  Negative for constipation, nausea and vomiting  Endocrine: Negative for cold intolerance and heat intolerance  Genitourinary: Negative for difficulty urinating, dysuria and frequency     Musculoskeletal: Negative for arthralgias, gait problem and myalgias  Neurological: Positive for headaches  Negative for dizziness, seizures, syncope, weakness and light-headedness  Psychiatric/Behavioral: Negative for confusion, dysphoric mood, sleep disturbance and suicidal ideas  The patient is nervous/anxious  PHQ-2/9 Depression Screening          Objective:  Vitals:    02/27/23 1217   BP: 110/72   BP Location: Left arm   Patient Position: Sitting   Cuff Size: Standard   Pulse: 84   Temp: 98 1 °F (36 7 °C)   SpO2: 94%   Weight: 80 7 kg (178 lb)   Height: 5' 5" (1 651 m)     Body mass index is 29 62 kg/m²  Physical Exam  Vitals and nursing note reviewed  Constitutional:       General: She is not in acute distress  Appearance: Normal appearance  She is not ill-appearing  HENT:      Head: Normocephalic and atraumatic  Mouth/Throat:      Mouth: Mucous membranes are moist    Eyes:      Extraocular Movements: Extraocular movements intact  Conjunctiva/sclera: Conjunctivae normal       Pupils: Pupils are equal, round, and reactive to light  Neck:      Vascular: Carotid bruit present  Cardiovascular:      Rate and Rhythm: Normal rate and regular rhythm  Heart sounds: Normal heart sounds  No murmur heard  Pulmonary:      Effort: Pulmonary effort is normal  No respiratory distress  Breath sounds: Normal breath sounds  No wheezing, rhonchi or rales  Abdominal:      General: Bowel sounds are normal  There is no distension  Palpations: Abdomen is soft  Tenderness: There is no abdominal tenderness  Musculoskeletal:      Cervical back: Normal range of motion and neck supple  No rigidity or tenderness  Right lower leg: No edema  Left lower leg: No edema  Lymphadenopathy:      Cervical: No cervical adenopathy  Neurological:      General: No focal deficit present  Mental Status: She is alert and oriented to person, place, and time  Mental status is at baseline  Cranial Nerves: No cranial nerve deficit  Motor: No weakness  Coordination: Coordination normal       Gait: Gait normal       Deep Tendon Reflexes: Reflexes normal    Psychiatric:         Mood and Affect: Mood normal          Behavior: Behavior normal          Thought Content:  Thought content normal          Judgment: Judgment normal

## 2023-03-01 LAB
HCV GENTYP SERPL NAA+PROBE: NORMAL
HCV PLEASE NOTE: NORMAL

## 2023-03-03 LAB
7AMINOCLONAZEPAM SAL QL CFM: ABNORMAL NG/ML
AMPHET SAL QL CFM: ABNORMAL NG/ML
BUPRENORPHINE SAL QL SCN: NEGATIVE NG/ML
CARBOXYTHC SAL QL CFM: NEGATIVE NG/ML
CCP IGG SERPL-ACNC: NEGATIVE
COCAINE SAL QL CFM: NEGATIVE NG/ML
CODEINE SAL QL CFM: NEGATIVE NG/ML
DXO+LEVORPHANOL SAL QL CFM: NEGATIVE NG/ML
EDDP SAL QL CFM: NEGATIVE NG/ML
GABAPENTIN SAL QL CFM: NEGATIVE NG/ML
HYDROCODONE SAL QL CFM: NEGATIVE NG/ML
LEUKEMIA MARKERS BLD-IMP: NEGATIVE NG/ML
M PROTEIN 3 UR ELPH-MCNC: NEGATIVE NG/ML
M TB TUBERC IGNF/MITOGEN IGNF CONTROL: NEGATIVE NG/ML
METHADONE SAL QL CFM: NEGATIVE NG/ML
MORPHINE SAL QL CFM: NEGATIVE NG/ML
NALTREXOL SAL QL CFM: NEGATIVE NG/ML
NALTREXONE SAL QL CFM: NEGATIVE NG/ML
OXYMORPHONE SAL QL CFM: NEGATIVE NG/ML
OXYMORPHONE SAL QL CFM: NEGATIVE NG/ML
PREGABALIN SAL QL CFM: NEGATIVE NG/ML
RESULT ALL_PRESCRIBED MEDS AND SPECIAL INSTRUCTIONS: NORMAL
SL AMB 6-MAM (HEROIN METABOLITE) QUANTIFICATION: NEGATIVE NG/ML
SL AMB ALPRAZOLAM QUANTIFICATION: NEGATIVE NG/ML
SL AMB CLONAZEPAM QUANTIFICATION: NEGATIVE NG/ML
SL AMB DIAZEPAM QUANTIFICATION: NEGATIVE NG/ML
SL AMB FENTANYL QUANTIFICATION: ABNORMAL NG/ML
SL AMB N-DESMETHYL-TRAMADOL QUANTIFICATION SALIVA: NEGATIVE NG/ML
SL AMB NORBUPRENORPHINE QUANTIFICATION: NEGATIVE NG/ML
SL AMB NORDIAZEPAM QUANTIFICATION: NEGATIVE NG/ML
SL AMB NORFENTANYL QUANTIFICATION: ABNORMAL NG/ML
SL AMB NORHYDROCODONE QUANTIFICATION: NEGATIVE NG/ML
SL AMB NORMEPERIDINE QUANTIFICATION: NEGATIVE NG/ML
SL AMB NOROXYCODONE QUANTIFICATION: NEGATIVE NG/ML
SL AMB OXAZEPAM QUANTIFICATION: NEGATIVE NG/ML
SL AMB RITALINIC ACID QUANTIFICATION: NEGATIVE
SL AMB TEMAZEPAM QUANTIFICATION: NEGATIVE NG/ML
SL AMB TEMAZEPAM QUANTIFICATION: NEGATIVE NG/ML
SL AMB TRAMADOL QUANTIFICATION: NEGATIVE NG/ML
SQUAMOUS #/AREA URNS HPF: NEGATIVE NG/ML
TAPENTADOL SAL QL CFM: NEGATIVE NG/ML

## 2024-08-27 ENCOUNTER — OFFICE VISIT (OUTPATIENT)
Dept: INTERNAL MEDICINE CLINIC | Facility: CLINIC | Age: 29
End: 2024-08-27
Payer: COMMERCIAL

## 2024-08-27 VITALS
SYSTOLIC BLOOD PRESSURE: 110 MMHG | OXYGEN SATURATION: 99 % | TEMPERATURE: 96.8 F | DIASTOLIC BLOOD PRESSURE: 72 MMHG | HEIGHT: 64 IN | WEIGHT: 183 LBS | BODY MASS INDEX: 31.24 KG/M2 | HEART RATE: 80 BPM

## 2024-08-27 DIAGNOSIS — Z23 ENCOUNTER FOR IMMUNIZATION: ICD-10-CM

## 2024-08-27 DIAGNOSIS — Z13.220 SCREENING FOR LIPID DISORDERS: ICD-10-CM

## 2024-08-27 DIAGNOSIS — Z13.0 SCREENING FOR DEFICIENCY ANEMIA: ICD-10-CM

## 2024-08-27 DIAGNOSIS — F19.10 POLYSUBSTANCE ABUSE (HCC): ICD-10-CM

## 2024-08-27 DIAGNOSIS — F31.9 BIPOLAR 1 DISORDER (HCC): ICD-10-CM

## 2024-08-27 DIAGNOSIS — Z87.898 HISTORY OF INTRAVENOUS DRUG ABUSE: ICD-10-CM

## 2024-08-27 DIAGNOSIS — Z12.4 SCREENING FOR CERVICAL CANCER: ICD-10-CM

## 2024-08-27 DIAGNOSIS — J45.20 MILD INTERMITTENT ASTHMA WITHOUT COMPLICATION: ICD-10-CM

## 2024-08-27 DIAGNOSIS — F41.1 GAD (GENERALIZED ANXIETY DISORDER): ICD-10-CM

## 2024-08-27 DIAGNOSIS — Z78.9 ELECTRONIC CIGARETTE USE: ICD-10-CM

## 2024-08-27 DIAGNOSIS — Z13.29 SCREENING FOR THYROID DISORDER: ICD-10-CM

## 2024-08-27 DIAGNOSIS — B18.2 CHRONIC HEPATITIS C WITHOUT HEPATIC COMA (HCC): Primary | ICD-10-CM

## 2024-08-27 DIAGNOSIS — F17.200 TOBACCO USE DISORDER: ICD-10-CM

## 2024-08-27 DIAGNOSIS — I07.1 TRACE TRICUSPID REGURGITATION BY PRIOR ECHOCARDIOGRAM: ICD-10-CM

## 2024-08-27 DIAGNOSIS — F12.10 MILD TETRAHYDROCANNABINOL (THC) ABUSE: ICD-10-CM

## 2024-08-27 DIAGNOSIS — N96 HISTORY OF MULTIPLE MISCARRIAGES: ICD-10-CM

## 2024-08-27 DIAGNOSIS — Z11.4 SCREENING FOR HIV (HUMAN IMMUNODEFICIENCY VIRUS): ICD-10-CM

## 2024-08-27 DIAGNOSIS — Z13.1 SCREENING FOR DIABETES MELLITUS (DM): ICD-10-CM

## 2024-08-27 DIAGNOSIS — I37.1: ICD-10-CM

## 2024-08-27 PROBLEM — F15.10 METHAMPHETAMINE ABUSE (HCC): Status: RESOLVED | Noted: 2017-08-31 | Resolved: 2024-08-27

## 2024-08-27 PROBLEM — F13.10 BENZODIAZEPINE ABUSE (HCC): Status: RESOLVED | Noted: 2017-08-31 | Resolved: 2024-08-27

## 2024-08-27 PROBLEM — F11.10 OPIOID ABUSE (HCC): Status: RESOLVED | Noted: 2017-08-31 | Resolved: 2024-08-27

## 2024-08-27 PROBLEM — Z79.899 MEDICATION THERAPY CONTINUED: Status: RESOLVED | Noted: 2023-02-27 | Resolved: 2024-08-27

## 2024-08-27 PROCEDURE — 99204 OFFICE O/P NEW MOD 45 MIN: CPT | Performed by: INTERNAL MEDICINE

## 2024-08-27 PROCEDURE — 90715 TDAP VACCINE 7 YRS/> IM: CPT

## 2024-08-27 PROCEDURE — 90471 IMMUNIZATION ADMIN: CPT

## 2024-08-27 NOTE — PROGRESS NOTES
Ambulatory Visit  Name: Yvonne Mckeon      : 1995      MRN: [id number]  Encounter Provider: Geoff Barajas DO  Encounter Date: 2024   Encounter department: [facility]    Assessment & Plan   1. Chronic hepatitis C without hepatic coma (HCC)  -     Comprehensive metabolic panel; Future  -     CBC and differential; Future  -     Hepatitis C antibody; Future  2. Encounter for immunization  -     TDAP VACCINE GREATER THAN OR EQUAL TO 8YO IM  3. Screening for cervical cancer  -     Ambulatory Referral to Obstetrics / Gynecology; Future  4. Polysubstance abuse (HCC)  -     Comprehensive metabolic panel; Future  -     CBC and differential; Future  -     [facility] All Prescribed Meds and Special Instructions  -     Amphetamines, Methamphetamines  -     Butalbital  -     Phenobarbital  -     Secobarbital  -     Alprazolam  -     Clonazepam  -     Diazepam  -     Lorazepam  -     Gabapentin  -     Pregabalin  -     Cocaine  -     Heroin  -     Buprenorphine  -     Levorphanol  -     Meperidine  -     Naltrexone  -     Fentanyl  -     Methadone  -     Oxycodone  -     Tapentadol  -     THC  -     Tramadol  -     Codeine, Hydrocodone, Hydropmorphone, Morphine  -     Bath Salts  -     Ethyl Glucuronide/Ethyl Sulfate  -     Kratom  -     Spice  -     Methylphenidate  -     Phentermine  -     Validity Oxidant  -     Validity Creatinine  -     Validity pH  -     Validity Specific  -     Xylazine Definitive Test  5. Tobacco use disorder  6. Bipolar 1 disorder (HCC)  7. Mild intermittent asthma without complication  8. LAINE (generalized anxiety disorder)  9. Screening for lipid disorders  -     Lipid Panel with Direct LDL reflex; Future  10. Screening for diabetes mellitus (DM)  -     Hemoglobin A1C; Future  11. Screening for thyroid disorder  -     TSH, 3rd generation; Future  12. Screening for deficiency anemia  -     CBC and differential; Future  13. Screening for HIV (human immunodeficiency  virus)  -     HIV 1/2 AB/AG w Reflex [facility] for 2 yr old and above; Future  14. Electronic cigarette use  15. Trace tricuspid regurgitation by prior echocardiogram  16. Trace pulmonic regurgitation by prior echocardiogram  17. Mild tetrahydrocannabinol (THC) abuse  18. History of multiple miscarriages  19. History of intravenous drug abuse  A/P: Stable and will check labs, including HIV and will check labs for Hep C. Discussed BMI and will give info on diet exercise. Wean tobacco and will check an alpha one. Keep f/u with an appt with an eye doc and DDS. F/u with GYN. Start SBE. Discussed vaccines will up date her Td. Will refer for medical THC card. . May need treatment for Hep c, await labs. Rash appears to be Schamberg's disease and will monitor. . Continue current treatment and RTC three weeks for f/u labs. .      History of Present Illness     WF w/o any prior PCP, , presents to establish. PMH includes polysubstance abuse, LAINE, bipolar, Hep C, and asthma. PSH of D&E. Daily Smoker and denies ETOH. Uses THC. Doing ok and no c/o's but wants to know is she still has Hep C, wants medical THC, and is due for screening tests. Reports being clean from drugs since 3/24 and stopped smoking one week ago. Remains active w/o difficulty and no falls. Denies depression. No suicidal or violent ideations. Working as a .. No recent travel. No fever, chills, or sweats. No unexplained wt change. Denies CP, SOB, palpitations, edema, orthopnea, or PND. No sz or syncope. No changes in bowel or bladder habits. No trouble swallowing. Appetite and sleep are good. Overdue to see both a DDS and an eye doctor.  Currently due for labs and vaccines.  .                 Review of Systems   Constitutional:  Negative for activity change, chills, diaphoresis, fatigue and fever.   HENT: Negative.     Eyes:  Negative for visual disturbance.   Respiratory:  Negative for cough, chest tightness, shortness of breath and wheezing.   "  Cardiovascular:  Negative for chest pain, palpitations and leg swelling.   Gastrointestinal:  Negative for abdominal pain, constipation, diarrhea, nausea and vomiting.   Endocrine: Negative for cold intolerance and heat intolerance.   Genitourinary:  Negative for difficulty urinating, dysuria and frequency.   Musculoskeletal:  Negative for arthralgias, gait problem and myalgias.   Skin:  Positive for rash.   Neurological:  Negative for dizziness, seizures, syncope, weakness, light-headedness and headaches.   Psychiatric/Behavioral:  Negative for confusion, dysphoric mood and sleep disturbance. The patient is not nervous/anxious.        Objective     /72 (BP Location: Left arm, Patient Position: Sitting)   Pulse 80   Temp (!) 96.8 °F (36 °C) (Tympanic)   Ht 5' 4\" (1.626 m)   Wt 83 kg (183 lb)   LMP 08/13/2024 (Approximate)   SpO2 99%   BMI 31.41 kg/m²     Physical Exam  Vitals and nursing note reviewed.   Constitutional:       General: She is not in acute distress.     Appearance: Normal appearance. She is not ill-appearing.   HENT:      Head: Normocephalic and atraumatic.      Mouth/Throat:      Mouth: Mucous membranes are moist.   Eyes:      Extraocular Movements: Extraocular movements intact.      Conjunctiva/sclera: Conjunctivae normal.      Pupils: Pupils are equal, round, and reactive to light.   Neck:      Vascular: No carotid bruit.   Cardiovascular:      Rate and Rhythm: Normal rate and regular rhythm.      Heart sounds: Normal heart sounds. No murmur heard.  Pulmonary:      Effort: Pulmonary effort is normal. No respiratory distress.      Breath sounds: Normal breath sounds. No wheezing, rhonchi or rales.   Abdominal:      General: Bowel sounds are normal. There is no distension.      Palpations: Abdomen is soft. There is no mass.      Tenderness: There is no abdominal tenderness. There is no right CVA tenderness, left CVA tenderness, guarding or rebound.   Musculoskeletal:         General: " No swelling, tenderness or deformity. Normal range of motion.      Cervical back: Normal range of motion and neck supple. No rigidity or tenderness.      Right lower leg: No edema.      Left lower leg: No edema.   Lymphadenopathy:      Cervical: No cervical adenopathy.   Neurological:      General: No focal deficit present.      Mental Status: She is alert and oriented to person, place, and time. Mental status is at baseline.      Cranial Nerves: No cranial nerve deficit.      Motor: No weakness.      Coordination: Coordination normal.      Gait: Gait normal.      Deep Tendon Reflexes: Reflexes normal.   Psychiatric:         Mood and Affect: Mood normal.         Behavior: Behavior normal.         Thought Content: Thought content normal.         Judgment: Judgment normal.       Administrative Statements         Tobacco Cessation Counseling: Tobacco cessation counseling and education was provided. The patient is sincerely urged to quit consumption of tobacco. She is not ready to quit tobacco. The numerous health risks of tobacco consumption were discussed. If she decides to quit, there are a number of helpful adjunctive aids, and she can see me to discuss nicotine replacement therapy, chantix, or bupropion anytime in the future.. I spent 5 minutes on Tobacco Cessation counseling during today's visit.

## 2024-08-27 NOTE — PATIENT INSTRUCTIONS
"Patient Education     Quitting smoking   The Basics   Written by the doctors and editors at [facility]   What are the benefits of quitting smoking? -- Quitting smoking can dramatically improve your health and help you live longer. It lowers your risk of heart disease, lung disease, kidney failure, cancer, infection, stomach problems, and diabetes.  Quitting smoking can also lower your chances of getting osteoporosis, a condition that makes your bones weak.  Quitting is not easy for most people, and it might take several tries to completely quit. But help and support are available. Quitting smoking will improve your health no matter how old you are, even if you have smoked for a long time.  What should I do if I want to quit smoking? -- It's a good idea to start by talking with your doctor or nurse. It is possible to quit on your own, without help. But getting help greatly increases your chances of quitting successfully.  When you are ready to quit, you will make a plan to:   Set a quit date.   Tell your family and friends that you plan to quit.   Plan ahead for the challenges you will face, such as cigarette cravings.   Remove cigarettes from your home, car, and work.  How can my doctor or nurse help? -- Your doctor or nurse can give you advice on the best way to quit. They can also give you medicines to:   Reduce your craving for cigarettes   Reduce your \"withdrawal\" symptoms (symptoms that happen when you stop smoking)  Your doctor or nurse can also help you find a counselor to talk to. For most people who are trying to quit smoking, it works best to use both medicines and counseling.  You can also get help from a free phone line (0-089-QUITNOW, or 1-371.652.7380) or go online to www.smokefree.gov.  What are the symptoms of withdrawal? -- When you stop smoking, you might have symptoms such as:   Trouble sleeping   Feeling irritable, anxious, or restless   Getting frustrated or angry   Having trouble thinking " clearly  These symptoms can be hard to deal with, which is why it can be so hard to quit. But medicines can help.  Some people who stop smoking become temporarily depressed. Some people need treatment for depression, such as counseling or medicines or both. People with depression might:   No longer enjoy or care about doing the things they used to like to do   Feel sad, down, hopeless, nervous, or cranky most of the day, almost every day   Lose or gain weight   Sleep too much or too little   Feel tired or like they have no energy   Feel guilty or like they are worth nothing   Forget things or feel confused   Move and speak more slowly than usual   Act restless or have trouble staying still   Think about death or suicide  If you think you might be depressed, tell your doctor or nurse right away. They can talk to you about your symptoms and recommend treatment if needed.  Get help right away if you are thinking of hurting or killing yourself! -- Sometimes, people with depression think of hurting or killing themselves. If you ever feel like you might hurt yourself, help is available:   In the , contact the [employer] Suicide & Crisis Lifeline:   To speak to someone, call or text [employer].   To talk to someone online, go to [email or web address].   Call your doctor or nurse, and tell them that it is an emergency.   Call for an ambulance (in the [place] and [place], call 9-1-1).   Go to the emergency department at your local hospital.  If you think your partner might have depression, or if you are worried that they might hurt themselves, get them help right away.  How does counseling work? -- A counselor can help you figure out:   What triggers you to want to smoke, and how to handle these situations   How to resist cravings   What you can do differently if you have tried to quit before  You can meet with a counselor in 1-on-1 sessions or as part of a group. There are other ways to get counseling, too, such as over the phone, through  "text messaging, or online.  How do medicines help you stop smoking? -- Different medicines work in different ways:   Nicotine replacement therapy - Nicotine is the main drug in cigarettes, and the reason they are addictive. These medicines reduce your body's craving for nicotine. They also help with withdrawal symptoms.  There are different forms of nicotine replacement, including skin patches, lozenges, gum, nasal sprays, and inhalers. Most can be bought without a prescription. Also, health insurance might cover some or all of the cost.  It often helps to use 2 forms of nicotine replacement. For example, you might wear a patch all the time, plus use gum or lozenges when you get a craving to smoke.   Varenicline - Varenicline (brand names: Chantix, Champix) is a prescription medicine that reduces withdrawal symptoms and cigarette cravings. Varenicline can increase the effects of alcohol in some people. It's a good idea to limit drinking while you're taking it, at least until you know how it affects you.  Even if you are not yet ready to commit to a quit date, varenicline can help reduce cravings. This can make it easier to quit when you are ready.   Bupropion - Bupropion (sample brand names: Wellbutrin, Zyban) is a prescription medicine that reduces your desire to smoke. It is also available in a generic version, which is cheaper than the brand name medicines. Doctors do not usually prescribe bupropion for people with seizures or who have had seizures in the past.  It might also be helpful to combine nicotine replacement with bupropion or varenicline. In some cases, a person might even take both bupropion and varenicline. Your doctor or nurse can help you figure out the best combination for you.  Can vaping help me quit? -- Sometimes, people wonder if vaping can help them quit smoking. Vaping is using electronic cigarettes, or \"e-cigarettes.\"  Doctors recommend using medicines and counseling to quit smoking. These " methods have been studied the most. But for people who have tried these and not been able to quit, switching to vaping might be an option. There are some things to remember:   Vaping might be less harmful than smoking regular cigarettes. But e-cigarettes still contain nicotine as well as other substances that might be harmful.   If you decide to try vaping to help you quit, it's important to switch completely from regular cigarettes to e-cigarettes. Using both will probably not be helpful, and might increase the risks of harm.   It's not clear how vaping can affect a person's health in the long term.  For these reasons, doctors recommend that if you do try vaping to help you quit smoking, you still make a plan to quit vaping eventually.  If you are interested in trying vaping to help you quit smoking, it's a good idea to talk to your doctor or nurse first.  What if I am pregnant and I smoke? -- If you are pregnant, it's really important for the health of your baby that you quit. Ask your doctor what options you have, and what is safest for your baby.  What if I have already smoked for a long time? -- The longer you have smoked, the higher your chances are of having health problems. But it is never too late to quit smoking. It helps your health even if you are older or have smoked for many years. The best thing you can do to lower your chance of having a health problem caused by smoking is to quit.  Will I gain weight if I quit? -- You might gain a few pounds. This can be frustrating for some people. But it's important to remember that you are improving your health by quitting smoking. You can help prevent gaining a lot of weight by staying active and eating a healthy diet.  What if I am not able to quit? -- If you don't quit on your first try, or if you quit but then start smoking again, don't give up hope. Lots of people have to try more than once before they are able to completely quit.  It might help to try to  understand why quitting did not work. There might be something you can do differently when you try again. It can help to figure out which situations make you want to smoke, so you can avoid them.  What else can I do to improve my chances of quitting? -- You can:   Get regular exercise. Any type of physical activity, even gentle forms of movement, is good for your health. Physical activity can also help reduce stress.   Stay away from people who smoke and places that make you want to smoke. If people close to you smoke, ask them to quit with you or avoid smoking around you.   Carry gum, hard candy, or something to put in your mouth. If you get a craving for a cigarette, try 1 of these instead.   Don't give up, even if you start smoking again. It takes most people a few tries before they succeed.  All topics are updated as new evidence becomes available and our peer review process is complete.  This topic retrieved from [employer] on: Mar 14, 2024.  Topic 56303 Version 33.0  Release: 32.2.4 - C32.72  © 2024 UpToDate, Inc. and/or its affiliates. All rights reserved.  Consumer Information Use and Disclaimer   Disclaimer: This generalized information is a limited summary of diagnosis, treatment, and/or medication information. It is not meant to be comprehensive and should be used as a tool to help the user understand and/or assess potential diagnostic and treatment options. It does NOT include all information about conditions, treatments, medications, side effects, or risks that may apply to a specific patient. It is not intended to be medical advice or a substitute for the medical advice, diagnosis, or treatment of a health care provider based on the health care provider's examination and assessment of a patient's specific and unique circumstances. Patients must speak with a health care provider for complete information about their health, medical questions, and treatment options, including any risks or benefits regarding  use of medications. This information does not endorse any treatments or medications as safe, effective, or approved for treating a specific patient. UpToDate, Inc. and its affiliates disclaim any warranty or liability relating to this information or the use thereof.The use of this information is governed by the Terms of Use, available at https://www.[employer].com/en/know/clinical-effectiveness-terms. 2024© UpToDate, Inc. and its affiliates and/or licensors. All rights reserved.  Copyright   © 2024 UpToDate, Inc. and/or its affiliates. All rights reserved.

## 2024-08-29 ENCOUNTER — APPOINTMENT (OUTPATIENT)
Dept: LAB | Facility: CLINIC | Age: 29
End: 2024-08-29
Payer: COMMERCIAL

## 2024-08-29 DIAGNOSIS — Z11.4 SCREENING FOR HIV (HUMAN IMMUNODEFICIENCY VIRUS): ICD-10-CM

## 2024-08-29 DIAGNOSIS — Z13.220 SCREENING FOR LIPID DISORDERS: ICD-10-CM

## 2024-08-29 DIAGNOSIS — F19.10 POLYSUBSTANCE ABUSE (HCC): ICD-10-CM

## 2024-08-29 DIAGNOSIS — Z13.1 SCREENING FOR DIABETES MELLITUS (DM): ICD-10-CM

## 2024-08-29 DIAGNOSIS — Z13.29 SCREENING FOR THYROID DISORDER: ICD-10-CM

## 2024-08-29 DIAGNOSIS — B18.2 CHRONIC HEPATITIS C WITHOUT HEPATIC COMA (HCC): ICD-10-CM

## 2024-08-29 DIAGNOSIS — Z13.0 SCREENING FOR DEFICIENCY ANEMIA: ICD-10-CM

## 2024-08-29 LAB
4OH-XYLAZINE UR QL CFM: NEGATIVE NG/ML
6MAM UR QL CFM: NEGATIVE NG/ML
7AMINOCLONAZEPAM UR QL CFM: NEGATIVE NG/ML
A-OH ALPRAZ UR QL CFM: NEGATIVE NG/ML
ACCEPTABLE CREAT UR QL: NORMAL MG/DL
ACCEPTIBLE SP GR UR QL: NORMAL
ALBUMIN SERPL BCG-MCNC: 4.5 G/DL (ref 3.5–5)
ALP SERPL-CCNC: 54 U/L (ref 34–104)
ALT SERPL W P-5'-P-CCNC: 19 U/L (ref 7–52)
AMPHET UR QL CFM: NEGATIVE NG/ML
ANION GAP SERPL CALCULATED.3IONS-SCNC: 8 MMOL/L (ref 4–13)
AST SERPL W P-5'-P-CCNC: 16 U/L (ref 13–39)
BASOPHILS # BLD AUTO: 0.06 THOUSANDS/ÂΜL (ref 0–0.1)
BASOPHILS NFR BLD AUTO: 1 % (ref 0–1)
BILIRUB SERPL-MCNC: 1.1 MG/DL (ref 0.2–1)
BUN SERPL-MCNC: 17 MG/DL (ref 5–25)
BUPRENORPHINE UR QL CFM: ABNORMAL NG/ML
BUTALBITAL UR QL CFM: NEGATIVE NG/ML
BZE UR QL CFM: NEGATIVE NG/ML
CALCIUM SERPL-MCNC: 10.3 MG/DL (ref 8.4–10.2)
CHLORIDE SERPL-SCNC: 104 MMOL/L (ref 96–108)
CHOLEST SERPL-MCNC: 177 MG/DL
CO2 SERPL-SCNC: 26 MMOL/L (ref 21–32)
CODEINE UR QL CFM: NEGATIVE NG/ML
CREAT SERPL-MCNC: 0.82 MG/DL (ref 0.6–1.3)
EDDP UR QL CFM: NEGATIVE NG/ML
EOSINOPHIL # BLD AUTO: 0.27 THOUSAND/ÂΜL (ref 0–0.61)
EOSINOPHIL NFR BLD AUTO: 3 % (ref 0–6)
ERYTHROCYTE [DISTWIDTH] IN BLOOD BY AUTOMATED COUNT: 12 % (ref 11.6–15.1)
EST. AVERAGE GLUCOSE BLD GHB EST-MCNC: 108 MG/DL
ETHYL GLUCURONIDE UR QL CFM: NEGATIVE NG/ML
ETHYL SULFATE UR QL SCN: NEGATIVE NG/ML
EUTYLONE UR QL: NEGATIVE NG/ML
FENTANYL UR QL CFM: NEGATIVE NG/ML
GFR SERPL CREATININE-BSD FRML MDRD: 95 ML/MIN/1.73SQ M
GLIADIN IGG SER IA-ACNC: NEGATIVE NG/ML
GLUCOSE P FAST SERPL-MCNC: 83 MG/DL (ref 65–99)
HBA1C MFR BLD: 5.4 %
HCT VFR BLD AUTO: 41.2 % (ref 34.8–46.1)
HDLC SERPL-MCNC: 52 MG/DL
HGB BLD-MCNC: 13.7 G/DL (ref 11.5–15.4)
HIV 1+2 AB+HIV1 P24 AG SERPL QL IA: NORMAL
HIV 2 AB SERPL QL IA: NORMAL
HIV1 AB SERPL QL IA: NORMAL
HIV1 P24 AG SERPL QL IA: NORMAL
HYDROCODONE UR QL CFM: NEGATIVE NG/ML
HYDROMORPHONE UR QL CFM: NEGATIVE NG/ML
IMM GRANULOCYTES # BLD AUTO: 0.05 THOUSAND/UL (ref 0–0.2)
IMM GRANULOCYTES NFR BLD AUTO: 1 % (ref 0–2)
LDLC SERPL CALC-MCNC: 101 MG/DL (ref 0–100)
LORAZEPAM UR QL CFM: NEGATIVE NG/ML
LYMPHOCYTES # BLD AUTO: 2.31 THOUSANDS/ÂΜL (ref 0.6–4.47)
LYMPHOCYTES NFR BLD AUTO: 24 % (ref 14–44)
MCH RBC QN AUTO: 29.6 PG (ref 26.8–34.3)
MCHC RBC AUTO-ENTMCNC: 33.3 G/DL (ref 31.4–37.4)
MCV RBC AUTO: 89 FL (ref 82–98)
ME-PHENIDATE UR QL CFM: NEGATIVE NG/ML
MEPERIDINE UR QL CFM: NEGATIVE NG/ML
METHADONE UR QL CFM: NEGATIVE NG/ML
METHAMPHET UR QL CFM: NEGATIVE NG/ML
MONOCYTES # BLD AUTO: 0.74 THOUSAND/ÂΜL (ref 0.17–1.22)
MONOCYTES NFR BLD AUTO: 8 % (ref 4–12)
MORPHINE UR QL CFM: NEGATIVE NG/ML
NALTREXONE UR QL CFM: NEGATIVE NG/ML
NEUTROPHILS # BLD AUTO: 6.32 THOUSANDS/ÂΜL (ref 1.85–7.62)
NEUTS SEG NFR BLD AUTO: 63 % (ref 43–75)
NITRITE UR QL: NORMAL UG/ML
NORBUPRENORPHINE UR QL CFM: ABNORMAL NG/ML
NORDIAZEPAM UR QL CFM: NEGATIVE NG/ML
NORFENTANYL UR QL CFM: NEGATIVE NG/ML
NORHYDROCODONE UR QL CFM: NEGATIVE NG/ML
NORMEPERIDINE UR QL CFM: NEGATIVE NG/ML
NOROXYCODONE UR QL CFM: NEGATIVE NG/ML
NRBC BLD AUTO-RTO: 0 /100 WBCS
OXAZEPAM UR QL CFM: NEGATIVE NG/ML
OXYCODONE UR QL CFM: NEGATIVE NG/ML
OXYMORPHONE UR QL CFM: NEGATIVE NG/ML
PARA-FLUOROFENTANYL QUANTIFICATION: NORMAL NG/ML
PHENOBARB UR QL CFM: NEGATIVE NG/ML
PLATELET # BLD AUTO: 226 THOUSANDS/UL (ref 149–390)
PMV BLD AUTO: 11.5 FL (ref 8.9–12.7)
POTASSIUM SERPL-SCNC: 3.9 MMOL/L (ref 3.5–5.3)
PROT SERPL-MCNC: 7.4 G/DL (ref 6.4–8.4)
RBC # BLD AUTO: 4.63 MILLION/UL (ref 3.81–5.12)
RESULT ALL_PRESCRIBED MEDS AND SPECIAL INSTRUCTIONS: NORMAL
SECOBARBITAL UR QL CFM: NEGATIVE NG/ML
SL AMB 5F-ADB-M7 METABOLITE QUANTIFICATION: NEGATIVE NG/ML
SL AMB 7-OH-MITRAGYNINE (KRATOM ALKALOID) QUANTIFICATION: NEGATIVE NG/ML
SL AMB AB-FUBINACA-M3 METABOLITE QUANTIFICATION: NEGATIVE NG/ML
SL AMB ACETYL FENTANYL QUANTIFICATION: NORMAL NG/ML
SL AMB ACETYL NORFENTANYL QUANTIFICATION: NORMAL NG/ML
SL AMB ACRYL FENTANYL QUANTIFICATION: NORMAL NG/ML
SL AMB CARFENTANIL QUANTIFICATION: NORMAL NG/ML
SL AMB CTHC (MARIJUANA METABOLITE) QUANTIFICATION: ABNORMAL NG/ML
SL AMB DEXTRORPHAN (DEXTROMETHORPHAN METABOLITE) QUANT: NEGATIVE NG/ML
SL AMB GABAPENTIN QUANTIFICATION: NEGATIVE NG/ML
SL AMB JWH018 METABOLITE QUANTIFICATION: NEGATIVE NG/ML
SL AMB JWH073 METABOLITE QUANTIFICATION: NEGATIVE NG/ML
SL AMB MDMB-FUBINACA-M1 METABOLITE QUANTIFICATION: NEGATIVE NG/ML
SL AMB METHYLONE QUANTIFICATION: NEGATIVE NG/ML
SL AMB N-DESMETHYL-TRAMADOL QUANTIFICATION: NEGATIVE NG/ML
SL AMB PHENTERMINE QUANTIFICATION: NEGATIVE NG/ML
SL AMB PREGABALIN QUANTIFICATION: NEGATIVE NG/ML
SL AMB RCS4 METABOLITE QUANTIFICATION: NEGATIVE NG/ML
SL AMB RITALINIC ACID QUANTIFICATION: NEGATIVE NG/ML
SMOOTH MUSCLE AB TITR SER IF: NEGATIVE NG/ML
SODIUM SERPL-SCNC: 138 MMOL/L (ref 135–147)
SPECIMEN DRAWN SERPL: NEGATIVE NG/ML
SPECIMEN PH ACCEPTABLE UR: NORMAL
TAPENTADOL UR QL CFM: NEGATIVE NG/ML
TEMAZEPAM UR QL CFM: NEGATIVE NG/ML
TRAMADOL UR QL CFM: NEGATIVE NG/ML
TRIGL SERPL-MCNC: 121 MG/DL
TSH SERPL DL<=0.05 MIU/L-ACNC: 1.32 UIU/ML (ref 0.45–4.5)
URATE/CREAT 24H UR: NEGATIVE NG/ML
WBC # BLD AUTO: 9.75 THOUSAND/UL (ref 4.31–10.16)
XYLAZINE UR QL CFM: NEGATIVE NG/ML

## 2024-08-29 PROCEDURE — 87389 HIV-1 AG W/HIV-1&-2 AB AG IA: CPT

## 2024-08-29 PROCEDURE — 84443 ASSAY THYROID STIM HORMONE: CPT

## 2024-08-29 PROCEDURE — 85025 COMPLETE CBC W/AUTO DIFF WBC: CPT

## 2024-08-29 PROCEDURE — 36415 COLL VENOUS BLD VENIPUNCTURE: CPT

## 2024-08-29 PROCEDURE — 83036 HEMOGLOBIN GLYCOSYLATED A1C: CPT

## 2024-08-29 PROCEDURE — 80061 LIPID PANEL: CPT

## 2024-08-29 PROCEDURE — 80053 COMPREHEN METABOLIC PANEL: CPT

## 2024-08-29 PROCEDURE — 86803 HEPATITIS C AB TEST: CPT

## 2024-08-30 ENCOUNTER — TELEPHONE (OUTPATIENT)
Dept: INTERNAL MEDICINE CLINIC | Facility: CLINIC | Age: 29
End: 2024-08-30

## 2024-08-30 LAB — HCV AB SER QL: REACTIVE

## 2024-08-30 NOTE — TELEPHONE ENCOUNTER
----- Message from Geoff Barajas DO sent at 8/30/2024  6:06 AM EDT -----  Call pt labs ok except for elevated calcium. Repeat calcium in four weeks.

## 2024-08-31 DIAGNOSIS — R76.8 HEPATITIS C ANTIBODY TEST POSITIVE: Primary | ICD-10-CM

## 2024-09-03 ENCOUNTER — TELEPHONE (OUTPATIENT)
Dept: INTERNAL MEDICINE CLINIC | Facility: CLINIC | Age: 29
End: 2024-09-03

## 2024-09-03 NOTE — TELEPHONE ENCOUNTER
----- Message from Geoff Barajas DO sent at 8/31/2024  7:52 AM EDT -----  Call pt, hep c positive. Needs additional testing. Orders in the chart.

## 2024-09-06 ENCOUNTER — TELEPHONE (OUTPATIENT)
Age: 29
End: 2024-09-06

## 2024-09-06 DIAGNOSIS — B37.31 VULVOVAGINAL CANDIDIASIS: Primary | ICD-10-CM

## 2024-09-06 RX ORDER — FLUCONAZOLE 150 MG/1
150 TABLET ORAL ONCE
Qty: 1 TABLET | Refills: 0 | Status: SHIPPED | OUTPATIENT
Start: 2024-09-06 | End: 2024-09-06

## 2024-09-06 NOTE — TELEPHONE ENCOUNTER
Pt reports she is going to take amoxicillin prophylactic for dental visit today. Pt is requesting if PCP can call in to her pharmacy,Medication(Diflucan). Pt reports she always gets yeast infections on this abx.    Pts pharmacy [facility] [address] [facility] [place] [address]  Any questions/concerns, please pt at Phone: [phone]

## 2024-09-10 ENCOUNTER — APPOINTMENT (OUTPATIENT)
Dept: LAB | Facility: CLINIC | Age: 29
End: 2024-09-10
Payer: COMMERCIAL

## 2024-09-10 DIAGNOSIS — R76.8 HEPATITIS C ANTIBODY TEST POSITIVE: ICD-10-CM

## 2024-09-10 PROCEDURE — 87522 HEPATITIS C REVRS TRNSCRPJ: CPT

## 2024-09-10 PROCEDURE — 87902 NFCT AGT GNTYP ALYS HEP C: CPT

## 2024-09-10 PROCEDURE — 87521 HEPATITIS C PROBE&RVRS TRNSC: CPT

## 2024-09-10 PROCEDURE — 36415 COLL VENOUS BLD VENIPUNCTURE: CPT

## 2024-09-13 LAB — HCV RNA SERPL NAA+PROBE-ACNC: NOT DETECTED K[IU]/ML

## 2024-09-15 LAB — HCV GENTYP SERPL NAA+PROBE: NORMAL

## 2024-09-16 ENCOUNTER — OFFICE VISIT (OUTPATIENT)
Dept: INTERNAL MEDICINE CLINIC | Facility: CLINIC | Age: 29
End: 2024-09-16
Payer: COMMERCIAL

## 2024-09-16 ENCOUNTER — TELEPHONE (OUTPATIENT)
Dept: INTERNAL MEDICINE CLINIC | Facility: CLINIC | Age: 29
End: 2024-09-16

## 2024-09-16 VITALS
SYSTOLIC BLOOD PRESSURE: 110 MMHG | OXYGEN SATURATION: 95 % | WEIGHT: 181.2 LBS | TEMPERATURE: 98.2 F | BODY MASS INDEX: 30.93 KG/M2 | DIASTOLIC BLOOD PRESSURE: 72 MMHG | HEART RATE: 58 BPM | HEIGHT: 64 IN

## 2024-09-16 DIAGNOSIS — Z23 ENCOUNTER FOR IMMUNIZATION: ICD-10-CM

## 2024-09-16 DIAGNOSIS — R76.8 HEPATITIS C ANTIBODY TEST POSITIVE: Primary | ICD-10-CM

## 2024-09-16 DIAGNOSIS — E83.52 HYPERCALCEMIA: ICD-10-CM

## 2024-09-16 DIAGNOSIS — R17 ELEVATED BILIRUBIN: ICD-10-CM

## 2024-09-16 DIAGNOSIS — K02.9 DENTAL CARIES: ICD-10-CM

## 2024-09-16 PROCEDURE — 99214 OFFICE O/P EST MOD 30 MIN: CPT | Performed by: INTERNAL MEDICINE

## 2024-09-16 RX ORDER — AMOXICILLIN 500 MG/1
500 CAPSULE ORAL EVERY 6 HOURS
COMMUNITY
Start: 2024-09-06

## 2024-09-16 RX ORDER — FLUCONAZOLE 150 MG/1
150 TABLET ORAL ONCE
Qty: 1 TABLET | Refills: 0 | Status: SHIPPED | OUTPATIENT
Start: 2024-09-16 | End: 2024-09-18

## 2024-09-16 NOTE — TELEPHONE ENCOUNTER
Attempted to call patient, mailbox is full. Pt has scheduled appointment 9/16 at 5pm, will be notified at appt

## 2024-09-16 NOTE — PROGRESS NOTES
Ambulatory Visit  Name: Yvonne Mckeon      : 1995      MRN: [id number]  Encounter Provider: Geoff Barajas DO  Encounter Date: 2024   Encounter department: [facility]    Assessment & Plan  Encounter for immunization    Orders:    Pneumococcal Conjugate Vaccine 20-valent (Pcv20)    Hypercalcemia    Orders:    Calcium, ionized; Future    Calcium; Future    Elevated bilirubin         Hepatitis C antibody test positive       A/P: stable and discussed labs. Appears pt had Hep C and cleared it. Will monitor. Suspect bili is due to ?[place]. Will monitor. Discussed calcium. Will repeat labs and if persists, check a cxr and PTH. Pt asymptomatic otherwise. Continue current treatment and RTC six months for routine. Will send in diflucan.   Dental caries    Orders:    fluconazole (DIFLUCAN) 150 mg tablet; Take 1 tablet (150 mg total) by mouth once for 1 dose       History of Present Illness     WF RTC for f/u labs. Pt reports h/o hepatitis c and not treated. Labs ok except for high calcium and bili. Hep C screen positive, but quantitative was negative. Feeling ok. No calcium replacement. No h/o elevated calcium. No new meds or dietary changes. No s/s of elevated calcium. No change in nails, hair, constipation, muscle weakness or mental status. On abx for dental issues and requesting diflucan.           Review of Systems   Constitutional:  Negative for activity change, chills, diaphoresis, fatigue and fever.   Respiratory:  Negative for cough, chest tightness, shortness of breath and wheezing.    Cardiovascular:  Negative for chest pain, palpitations and leg swelling.   Gastrointestinal:  Negative for abdominal pain, constipation, diarrhea, nausea and vomiting.   Genitourinary:  Negative for difficulty urinating, dysuria and frequency.   Musculoskeletal:  Negative for arthralgias, gait problem and myalgias.   Neurological:  Negative for dizziness, seizures, syncope, weakness, light-headedness  "and headaches.   Psychiatric/Behavioral:  Negative for confusion, dysphoric mood and sleep disturbance. The patient is not nervous/anxious.            Objective     /72   Pulse 58   Temp 98.2 °F (36.8 °C)   Ht 5' 4\" (1.626 m)   Wt 82.2 kg (181 lb 3.2 oz)   LMP 08/13/2024 (Approximate)   SpO2 95%   BMI 31.10 kg/m²     Physical Exam  Vitals and nursing note reviewed.   Constitutional:       General: She is not in acute distress.     Appearance: Normal appearance. She is not ill-appearing.   HENT:      Head: Normocephalic and atraumatic.      Mouth/Throat:      Mouth: Mucous membranes are moist.   Eyes:      Extraocular Movements: Extraocular movements intact.      Conjunctiva/sclera: Conjunctivae normal.      Pupils: Pupils are equal, round, and reactive to light.   Neck:      Vascular: No carotid bruit.   Cardiovascular:      Rate and Rhythm: Normal rate and regular rhythm.      Heart sounds: Normal heart sounds. No murmur heard.  Pulmonary:      Effort: Pulmonary effort is normal. No respiratory distress.      Breath sounds: Normal breath sounds. No wheezing, rhonchi or rales.   Abdominal:      General: Bowel sounds are normal. There is no distension.      Palpations: Abdomen is soft.      Tenderness: There is no abdominal tenderness.   Musculoskeletal:      Cervical back: Neck supple.      Right lower leg: No edema.      Left lower leg: No edema.   Neurological:      General: No focal deficit present.      Mental Status: She is alert and oriented to person, place, and time. Mental status is at baseline.      Deep Tendon Reflexes: Reflexes normal.   Psychiatric:         Mood and Affect: Mood normal.         Behavior: Behavior normal.         Thought Content: Thought content normal.         Judgment: Judgment normal.         "

## 2024-09-16 NOTE — ASSESSMENT & PLAN NOTE
A/P: stable and discussed labs. Appears pt had Hep C and cleared it. Will monitor. Suspect bili is due to ?[place]. Will monitor. Discussed calcium. Will repeat labs and if persists, check a cxr and PTH. Pt asymptomatic otherwise. Continue current treatment and RTC six months for routine. Will send in diflucan.

## 2024-09-16 NOTE — TELEPHONE ENCOUNTER
----- Message from Geoff Barajas DO sent at 9/13/2024  3:15 PM EDT -----  Call pt test is negative

## 2024-09-17 DIAGNOSIS — K02.9 DENTAL CARIES: ICD-10-CM

## 2024-09-18 RX ORDER — FLUCONAZOLE 150 MG/1
150 TABLET ORAL ONCE
Qty: 1 TABLET | Refills: 0 | Status: SHIPPED | OUTPATIENT
Start: 2024-09-18 | End: 2024-09-18

## 2024-09-24 LAB
MISCELLANEOUS LAB TEST RESULT: NORMAL
STONE ANALYSIS-IMP: NORMAL

## 2024-10-24 ENCOUNTER — APPOINTMENT (EMERGENCY)
Dept: ULTRASOUND IMAGING | Facility: HOSPITAL | Age: 29
End: 2024-10-24
Payer: COMMERCIAL

## 2024-10-24 ENCOUNTER — HOSPITAL ENCOUNTER (EMERGENCY)
Facility: HOSPITAL | Age: 29
Discharge: HOME | End: 2024-10-25
Attending: EMERGENCY MEDICINE | Admitting: EMERGENCY MEDICINE
Payer: COMMERCIAL

## 2024-10-24 DIAGNOSIS — O20.0 THREATENED MISCARRIAGE: Primary | ICD-10-CM

## 2024-10-24 DIAGNOSIS — O46.90 VAGINAL BLEEDING DURING PREGNANCY: ICD-10-CM

## 2024-10-24 LAB
ABO GROUP BLD: NORMAL
ANION GAP SERPL CALCULATED.3IONS-SCNC: 7 MMOL/L (ref 4–13)
B-HCG SERPL-ACNC: ABNORMAL MIU/ML (ref 0–5)
BACTERIA UR QL AUTO: ABNORMAL /HPF
BASOPHILS # BLD AUTO: 0.06 THOUSANDS/ΜL (ref 0–0.1)
BASOPHILS NFR BLD AUTO: 0 % (ref 0–1)
BILIRUB UR QL STRIP: NEGATIVE
BUN SERPL-MCNC: 9 MG/DL (ref 5–25)
CALCIUM SERPL-MCNC: 10.6 MG/DL (ref 8.4–10.2)
CHLORIDE SERPL-SCNC: 104 MMOL/L (ref 96–108)
CLARITY UR: ABNORMAL
CO2 SERPL-SCNC: 23 MMOL/L (ref 21–32)
COLOR UR: YELLOW
CREAT SERPL-MCNC: 0.78 MG/DL (ref 0.6–1.3)
EOSINOPHIL # BLD AUTO: 0.3 THOUSAND/ΜL (ref 0–0.61)
EOSINOPHIL NFR BLD AUTO: 2 % (ref 0–6)
ERYTHROCYTE [DISTWIDTH] IN BLOOD BY AUTOMATED COUNT: 12.2 % (ref 11.6–15.1)
EXT PREGNANCY TEST URINE: POSITIVE
EXT. CONTROL: ABNORMAL
GFR SERPL CREATININE-BSD FRML MDRD: 101 ML/MIN/1.73SQ M
GLUCOSE SERPL-MCNC: 89 MG/DL (ref 65–140)
GLUCOSE UR STRIP-MCNC: NEGATIVE MG/DL
HCT VFR BLD AUTO: 41.3 % (ref 34.8–46.1)
HGB BLD-MCNC: 13.8 G/DL (ref 11.5–15.4)
HGB UR QL STRIP.AUTO: ABNORMAL
IMM GRANULOCYTES # BLD AUTO: 0.08 THOUSAND/UL (ref 0–0.2)
IMM GRANULOCYTES NFR BLD AUTO: 1 % (ref 0–2)
KETONES UR STRIP-MCNC: ABNORMAL MG/DL
LEUKOCYTE ESTERASE UR QL STRIP: ABNORMAL
LYMPHOCYTES # BLD AUTO: 3.64 THOUSANDS/ΜL (ref 0.6–4.47)
LYMPHOCYTES NFR BLD AUTO: 23 % (ref 14–44)
MCH RBC QN AUTO: 29.2 PG (ref 26.8–34.3)
MCHC RBC AUTO-ENTMCNC: 33.4 G/DL (ref 31.4–37.4)
MCV RBC AUTO: 88 FL (ref 82–98)
MONOCYTES # BLD AUTO: 0.96 THOUSAND/ΜL (ref 0.17–1.22)
MONOCYTES NFR BLD AUTO: 6 % (ref 4–12)
NEUTROPHILS # BLD AUTO: 10.61 THOUSANDS/ΜL (ref 1.85–7.62)
NEUTS SEG NFR BLD AUTO: 68 % (ref 43–75)
NITRITE UR QL STRIP: NEGATIVE
NON-SQ EPI CELLS URNS QL MICRO: ABNORMAL /HPF
NRBC BLD AUTO-RTO: 0 /100 WBCS
PH UR STRIP.AUTO: 5.5 [PH]
PLATELET # BLD AUTO: 246 THOUSANDS/UL (ref 149–390)
PMV BLD AUTO: 10.3 FL (ref 8.9–12.7)
POTASSIUM SERPL-SCNC: 3.8 MMOL/L (ref 3.5–5.3)
PROT UR STRIP-MCNC: ABNORMAL MG/DL
RBC # BLD AUTO: 4.72 MILLION/UL (ref 3.81–5.12)
RBC #/AREA URNS AUTO: ABNORMAL /HPF
RH BLD: NEGATIVE
SODIUM SERPL-SCNC: 134 MMOL/L (ref 135–147)
SP GR UR STRIP.AUTO: 1.02 (ref 1–1.03)
UROBILINOGEN UR STRIP-ACNC: <2 MG/DL
WBC # BLD AUTO: 15.65 THOUSAND/UL (ref 4.31–10.16)
WBC #/AREA URNS AUTO: ABNORMAL /HPF

## 2024-10-24 PROCEDURE — 86900 BLOOD TYPING SEROLOGIC ABO: CPT | Performed by: EMERGENCY MEDICINE

## 2024-10-24 PROCEDURE — 80048 BASIC METABOLIC PNL TOTAL CA: CPT | Performed by: EMERGENCY MEDICINE

## 2024-10-24 PROCEDURE — 81025 URINE PREGNANCY TEST: CPT | Performed by: EMERGENCY MEDICINE

## 2024-10-24 PROCEDURE — 96360 HYDRATION IV INFUSION INIT: CPT

## 2024-10-24 PROCEDURE — 36415 COLL VENOUS BLD VENIPUNCTURE: CPT | Performed by: EMERGENCY MEDICINE

## 2024-10-24 PROCEDURE — 84702 CHORIONIC GONADOTROPIN TEST: CPT | Performed by: EMERGENCY MEDICINE

## 2024-10-24 PROCEDURE — 86850 RBC ANTIBODY SCREEN: CPT | Performed by: EMERGENCY MEDICINE

## 2024-10-24 PROCEDURE — 86901 BLOOD TYPING SEROLOGIC RH(D): CPT | Performed by: EMERGENCY MEDICINE

## 2024-10-24 PROCEDURE — 76801 OB US < 14 WKS SINGLE FETUS: CPT

## 2024-10-24 PROCEDURE — 99284 EMERGENCY DEPT VISIT MOD MDM: CPT | Performed by: EMERGENCY MEDICINE

## 2024-10-24 PROCEDURE — 81001 URINALYSIS AUTO W/SCOPE: CPT | Performed by: EMERGENCY MEDICINE

## 2024-10-24 PROCEDURE — 99284 EMERGENCY DEPT VISIT MOD MDM: CPT

## 2024-10-24 PROCEDURE — 87086 URINE CULTURE/COLONY COUNT: CPT | Performed by: EMERGENCY MEDICINE

## 2024-10-24 PROCEDURE — 96361 HYDRATE IV INFUSION ADD-ON: CPT

## 2024-10-24 PROCEDURE — 85025 COMPLETE CBC W/AUTO DIFF WBC: CPT | Performed by: EMERGENCY MEDICINE

## 2024-10-24 RX ADMIN — SODIUM CHLORIDE 1000 ML: 0.9 INJECTION, SOLUTION INTRAVENOUS at 21:23

## 2024-10-25 VITALS
RESPIRATION RATE: 20 BRPM | OXYGEN SATURATION: 97 % | HEIGHT: 64 IN | HEART RATE: 58 BPM | DIASTOLIC BLOOD PRESSURE: 66 MMHG | TEMPERATURE: 98.3 F | SYSTOLIC BLOOD PRESSURE: 116 MMHG | WEIGHT: 182.98 LBS | BODY MASS INDEX: 31.24 KG/M2

## 2024-10-25 LAB
ABO GROUP BLD: NORMAL
BLD GP AB SCN SERPL QL: NEGATIVE
RH BLD: NEGATIVE

## 2024-10-25 PROCEDURE — 96372 THER/PROPH/DIAG INJ SC/IM: CPT

## 2024-10-25 RX ADMIN — RHO(D) IMMUNE GLOBULIN (HUMAN) 300 MCG: 1500 SOLUTION INTRAMUSCULAR at 00:30

## 2024-10-25 NOTE — ED PROVIDER NOTES
Time reflects when diagnosis was documented in both MDM as applicable and the Disposition within this note       Time User Action Codes Description Comment    10/24/2024 11:28 PM Padma Levine Add [O20.0] Threatened miscarriage     10/24/2024 11:28 PM Padma Levine Add [O46.90] Vaginal bleeding during pregnancy           ED Disposition       ED Disposition   Discharge    Condition   Stable    Date/Time   Thu Oct 24, 2024 11:28 PM    Comment   Yvonne Mckeon discharge to home/self care.                   Assessment & Plan       Medical Decision Making  30 y/o female with vaginal bleeding in pregnancy - will get labs, hcg quant, type and screen, and US to r/o ectopic.     Amount and/or Complexity of Data Reviewed  Labs: ordered. Decision-making details documented in ED Course.  Radiology: ordered.    Risk  Prescription drug management.        ED Course as of 10/24/24 2349   Thu Oct 24, 2024   2139 PREGNANCY TEST URINE(!): Positive   2139 Hemoglobin: 13.8   2214 HCG QUANTITATIVE(!): 132,169.0   2233 Rh Factor: Negative       Medications   Rho(D) immune globulin (RHOGAM ULTRA-FILTERED PLUS) IM injection 300 mcg (has no administration in time range)   sodium chloride 0.9 % bolus 1,000 mL (1,000 mL Intravenous New Bag 10/24/24 2123)       ED Risk Strat Scores                           SBIRT 22yo+      Flowsheet Row Most Recent Value   Initial Alcohol Screen: US AUDIT-C     1. How often do you have a drink containing alcohol? 0 Filed at: 10/24/2024 2024   2. How many drinks containing alcohol do you have on a typical day you are drinking?  0 Filed at: 10/24/2024 2024   3a. Male UNDER 65: How often do you have five or more drinks on one occasion? 0 Filed at: 10/24/2024 2024   3b. FEMALE Any Age, or MALE 65+: How often do you have 4 or more drinks on one occassion? 0 Filed at: 10/24/2024 2024   Audit-C Score 0 Filed at: 10/24/2024 2024   AMELIA: How many times in the past year have you...    Used an illegal drug or  used a prescription medication for non-medical reasons? Never Filed at: 10/24/2024 2024                            History of Present Illness       Chief Complaint   Patient presents with    Vaginal Bleeding - Pregnant     Reports being 5 weeks pregnant and started with constant vaginal bleeding and cramping x1 hour       Past Medical History:   Diagnosis Date    Anxiety     Asthma     Chronic pain disorder     Depression     Heart murmur 2023    Hepatitis C     Scoliosis     Scoliosis     Self-injurious behavior     Substance abuse (HCC)     Suicide attempt (HCC)     Withdrawal symptoms, drug or narcotic (HCC)       Past Surgical History:   Procedure Laterality Date    DILATION AND EVACUATION        Family History   Problem Relation Age of Onset    Thyroid disease Mother     Substance Abuse Brother     Asthma Brother       Social History     Tobacco Use    Smoking status: Former     Current packs/day: 0.50     Average packs/day: 0.5 packs/day for 15.0 years (7.5 ttl pk-yrs)     Types: Cigarettes     Passive exposure: Never    Tobacco comments:     Just quit one week ago   Vaping Use    Vaping status: Some Days    Substances: Flavoring   Substance Use Topics    Alcohol use: Never    Drug use: Not Currently     Types: Heroin, Benzodiazepines, Methamphetamines     Comment: heroin clean 7 months as of 10/2024      E-Cigarette/Vaping    E-Cigarette Use Current Some Day User       E-Cigarette/Vaping Substances    Nicotine No     THC No     CBD No     Flavoring Yes     Other No     Unknown No       I have reviewed and agree with the history as documented.     28 y/o female  currently about 5 weeks gestation by dates, presents to the ED for abd pain and vaginal bleeding. Patient states that her FDLMP was . States that she had a positive home pregnancy test and had a virtual visit with her OB today. She reports hx of 2 prior miscarriages years ago. States that today she developed abd cramping and vaginal  bleeding 2 hours ago. States that she has been going through a pad over the last hour. States that there are small clots. Denies any urinary symptoms, n.v, or trauma. No other complaints.       History provided by:  Patient      Review of Systems   Constitutional:  Negative for chills and fever.   HENT:  Negative for congestion, ear pain and sore throat.    Eyes:  Negative for pain and visual disturbance.   Respiratory:  Negative for cough, shortness of breath and wheezing.    Cardiovascular:  Negative for chest pain and leg swelling.   Gastrointestinal:  Positive for abdominal pain. Negative for diarrhea, nausea and vomiting.   Genitourinary:  Positive for vaginal bleeding. Negative for dysuria, frequency, hematuria and urgency.   Musculoskeletal:  Negative for neck pain and neck stiffness.   Skin:  Negative for rash and wound.   Neurological:  Negative for weakness, numbness and headaches.   Psychiatric/Behavioral:  Negative for agitation and confusion.    All other systems reviewed and are negative.          Objective       ED Triage Vitals [10/24/24 2021]   Temperature Pulse Blood Pressure Respirations SpO2 Patient Position - Orthostatic VS   98.3 °F (36.8 °C) 62 119/67 20 100 % Sitting      Temp Source Heart Rate Source BP Location FiO2 (%) Pain Score    Oral Monitor Left arm -- --      Vitals      Date and Time Temp Pulse SpO2 Resp BP Pain Score FACES Pain Rating User   10/24/24 2021 98.3 °F (36.8 °C) 62 100 % 20 119/67 -- -- AG            Physical Exam  Vitals and nursing note reviewed.   Constitutional:       Appearance: She is well-developed.   HENT:      Head: Normocephalic and atraumatic.   Eyes:      Pupils: Pupils are equal, round, and reactive to light.   Cardiovascular:      Rate and Rhythm: Normal rate and regular rhythm.   Pulmonary:      Effort: Pulmonary effort is normal.      Breath sounds: Normal breath sounds.   Abdominal:      General: Bowel sounds are normal.      Palpations: Abdomen is soft.       Tenderness: There is no abdominal tenderness.   Musculoskeletal:         General: Normal range of motion.      Cervical back: Normal range of motion and neck supple.   Skin:     General: Skin is warm and dry.   Neurological:      General: No focal deficit present.      Mental Status: She is alert and oriented to person, place, and time.      Comments: No focal deficits         Results Reviewed       Procedure Component Value Units Date/Time    hCG, quantitative [[id number]]  (Abnormal) Collected: 10/24/24 2105    Lab Status: Final result Specimen: Blood from Arm, Right Updated: 10/24/24 2156     HCG, Quant 132,169.0 mIU/mL     Narrative:       Expected Ranges:    HCG results between 5.0 and 25.0 mIU/mL may be indicative of early pregnancy but should be interpreted in light of the total clinical presentation.    HCG can rise to detectable levels in robe and post menopausal women (0-11.6 mIU/mL).     Approximate               Approximate HCG  Gestation age          Concentration ( mIU/mL)  _____________          ______________________   Weeks                      HCG values  0.2-1                       5-50  1-2                           2-3                         100-5000  3-4                         500-92007  4-5                         1000-51083  5-6                         [id number]  6-8                         85852-107991  8-12                        [id number]      Basic metabolic panel [[id number]]  (Abnormal) Collected: 10/24/24 2105    Lab Status: Final result Specimen: Blood from Arm, Right Updated: 10/24/24 2149     Sodium 134 mmol/L      Potassium 3.8 mmol/L      Chloride 104 mmol/L      CO2 23 mmol/L      ANION GAP 7 mmol/L      BUN 9 mg/dL      Creatinine 0.78 mg/dL      Glucose 89 mg/dL      Calcium 10.6 mg/dL      eGFR 101 ml/min/1.73sq m     Narrative:      National Kid[employer] guidelines for Chronic Kidney Disease (CKD):     Stage 1 with normal or high GFR (GFR > 90  mL/min/1.73 square meters)    Stage 2 Mild CKD (GFR = 60-89 mL/min/1.73 square meters)    Stage 3A Moderate CKD (GFR = 45-59 mL/min/1.73 square meters)    Stage 3B Moderate CKD (GFR = 30-44 mL/min/1.73 square meters)    Stage 4 Severe CKD (GFR = 15-29 mL/min/1.73 square meters)    Stage 5 End Stage CKD (GFR <15 mL/min/1.73 square meters)  Note: GFR calculation is accurate only with a steady state creatinine    Urine Microscopic [[id number]]  (Abnormal) Collected: 10/24/24 2121    Lab Status: Final result Specimen: Urine, Clean Catch Updated: 10/24/24 2144     RBC, UA Innumerable /hpf      WBC, UA 10-20 /hpf      Epithelial Cells Occasional /hpf      Bacteria, UA None Seen /hpf      URINE COMMENT --    UA w Reflex to Microscopic w Reflex to Culture [[id number]]  (Abnormal) Collected: 10/24/24 2121    Lab Status: Final result Specimen: Urine, Clean Catch Updated: 10/24/24 2139     Color, UA Yellow     Clarity, UA Turbid     Specific Gravity, UA 1.017     pH, UA 5.5     Leukocytes, UA Trace     Nitrite, UA Negative     Protein, UA Trace mg/dl      Glucose, UA Negative mg/dl      Ketones, UA Trace mg/dl      Urobilinogen, UA <2.0 mg/dl      Bilirubin, UA Negative     Occult Blood, UA Large     URINE COMMENT --    Urine culture [[id number]] Collected: 10/24/24 2121    Lab Status: In process Specimen: Urine, Clean Catch Updated: 10/24/24 2139    POCT pregnancy, urine [[id number]]  (Abnormal) Resulted: 10/24/24 2124    Lab Status: Final result Updated: 10/24/24 2124     EXT Preg Test, Ur Positive     Control Valid    CBC and differential [[id number]]  (Abnormal) Collected: 10/24/24 2105    Lab Status: Final result Specimen: Blood from Arm, Right Updated: 10/24/24 2118     WBC 15.65 Thousand/uL      RBC 4.72 Million/uL      Hemoglobin 13.8 g/dL      Hematocrit 41.3 %      MCV 88 fL      MCH 29.2 pg      MCHC 33.4 g/dL      RDW 12.2 %      MPV 10.3 fL      Platelets 246 Thousands/uL      nRBC 0 /100 WBCs      Segmented % 68 %       Immature Grans % 1 %      Lymphocytes % 23 %      Monocytes % 6 %      Eosinophils Relative 2 %      Basophils Relative 0 %      Absolute Neutrophils 10.61 Thousands/µL      Absolute Immature Grans 0.08 Thousand/uL      Absolute Lymphocytes 3.64 Thousands/µL      Absolute Monocytes 0.96 Thousand/µL      Eosinophils Absolute 0.30 Thousand/µL      Basophils Absolute 0.06 Thousands/µL             US OB < 14 weeks single or first gestation level 1   Final Interpretation by Fady John DO (10/24 1363)      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 Within the right uterine horn, there is a live intrauterine gestation at 6 weeks and 5 days (range +/- 0 weeks and 4 days).      Suspected heterogeneous collection in along the medial aspect of the endometrium measuring approximately 3.0 x 2.0 x 3.3 cm in size, suspect subchorionic hemorrhage      Complex lesion in the right ovary, probably the corpus luteum, bilateral ovaries otherwise have a normal appearance.      Other findings as above.      CELINE of 06/14/2025.         Workstation performed: [id number]             Procedures    ED Medication and Procedure Management   Prior to Admission Medications   Prescriptions Last Dose Informant Patient Reported? Taking?   amoxicillin (AMOXIL) 500 mg capsule   Yes No   Sig: Take 500 mg by mouth every 6 (six) hours      Facility-Administered Medications: None     Patient's Medications   Discharge Prescriptions    No medications on file     No discharge procedures on file.  ED SEPSIS DOCUMENTATION   Time reflects when diagnosis was documented in both MDM as applicable and the Disposition within this note       Time User Action Codes Description Comment    10/24/2024 11:28 PM Padma Levine Add [O20.0] Threatened miscarriage     10/24/2024 11:28 PM Padma Levine Add [O46.90] Vaginal bleeding during pregnancy                  Padma Levine DO  10/24/24 1355

## 2024-10-26 LAB — BACTERIA UR CULT: NORMAL

## 2024-12-02 ENCOUNTER — OFFICE VISIT (OUTPATIENT)
Dept: INTERNAL MEDICINE CLINIC | Facility: CLINIC | Age: 29
End: 2024-12-02
Payer: COMMERCIAL

## 2024-12-02 ENCOUNTER — HOSPITAL ENCOUNTER (EMERGENCY)
Facility: HOSPITAL | Age: 29
Discharge: HOME | End: 2024-12-03
Attending: EMERGENCY MEDICINE
Payer: COMMERCIAL

## 2024-12-02 VITALS
WEIGHT: 185 LBS | SYSTOLIC BLOOD PRESSURE: 116 MMHG | HEART RATE: 95 BPM | HEIGHT: 64 IN | OXYGEN SATURATION: 99 % | BODY MASS INDEX: 31.58 KG/M2 | DIASTOLIC BLOOD PRESSURE: 64 MMHG | TEMPERATURE: 99 F

## 2024-12-02 DIAGNOSIS — Z34.90 PREGNANCY, UNSPECIFIED GESTATIONAL AGE: ICD-10-CM

## 2024-12-02 DIAGNOSIS — J40 SINOBRONCHITIS: Primary | ICD-10-CM

## 2024-12-02 DIAGNOSIS — J32.9 SINOBRONCHITIS: Primary | ICD-10-CM

## 2024-12-02 DIAGNOSIS — J10.1 INFLUENZA A: Primary | ICD-10-CM

## 2024-12-02 LAB
SARS-COV-2 AG UPPER RESP QL IA: NEGATIVE
VALID CONTROL: NORMAL

## 2024-12-02 PROCEDURE — 99213 OFFICE O/P EST LOW 20 MIN: CPT | Performed by: INTERNAL MEDICINE

## 2024-12-02 PROCEDURE — 87811 SARS-COV-2 COVID19 W/OPTIC: CPT | Performed by: INTERNAL MEDICINE

## 2024-12-02 PROCEDURE — 99283 EMERGENCY DEPT VISIT LOW MDM: CPT

## 2024-12-02 PROCEDURE — 99284 EMERGENCY DEPT VISIT MOD MDM: CPT | Performed by: EMERGENCY MEDICINE

## 2024-12-02 RX ORDER — PYRIDOXINE HYDROCHLORIDE 100 MG/ML
50 INJECTION, SOLUTION INTRAMUSCULAR; INTRAVENOUS ONCE
Status: DISCONTINUED | OUTPATIENT
Start: 2024-12-03 | End: 2024-12-03

## 2024-12-02 RX ORDER — AZITHROMYCIN 250 MG/1
TABLET, FILM COATED ORAL
Qty: 6 TABLET | Refills: 0 | Status: SHIPPED | OUTPATIENT
Start: 2024-12-02 | End: 2024-12-05

## 2024-12-02 RX ORDER — MULTIVIT-MIN/IRON/FOLIC ACID/K 18-600-40
500 CAPSULE ORAL DAILY
COMMUNITY
Start: 2024-11-20 | End: 2025-11-20

## 2024-12-02 RX ORDER — ALBUTEROL SULFATE 90 UG/1
2 INHALANT RESPIRATORY (INHALATION) EVERY 6 HOURS PRN
Qty: 18 G | Refills: 5 | Status: SHIPPED | OUTPATIENT
Start: 2024-12-02

## 2024-12-02 NOTE — PROGRESS NOTES
Name: Yvonne Mckeon      : 1995      MRN: [id number]  Encounter Provider: Geoff Barajas DO  Encounter Date: 2024   Encounter department: [facility]  :  Assessment & Plan  Sinobronchitis    Orders:    azithromycin (ZITHROMAX) 250 mg tablet; Take 2 tablets today then 1 tablet daily x 4 days    albuterol (Ventolin HFA) 90 mcg/act inhaler; Inhale 2 puffs every 6 (six) hours as needed for wheezing    POCT Rapid Covid Ag    Pregnancy, unspecified gestational age           A/P: Stable. POCT covid was negative. . Rest and increase po fluids. Warm salt water gargles. OTC PRN motrin/tylenol. May boost immunity with vit c, vit d, and zinc. Will start  abx, but due to pregnancy, will need to hold on mucinex, INS, cough med. Will send in STEPHANIE for the asthma. No need for a steroid wean. No  need for imaging. Continue current treatment and RTC as scheduled.       History of Present Illness     NO smoking, asthmatic WF 13 weeks pregnant , presents for several day history of URI s/s. Unvaccinated. No travel and no one else ill. Reports no fever, but some chills, headaches with nasal congestion with PND and moderate sore throat. Productive green cough with some SOB and wheezing.        Review of Systems   Constitutional:  Positive for activity change, appetite change, chills and fatigue. Negative for diaphoresis and fever.   HENT:  Positive for congestion, postnasal drip, rhinorrhea and sore throat. Negative for ear discharge, ear pain, facial swelling, sinus pressure and sinus pain.    Respiratory:  Positive for cough, shortness of breath and wheezing. Negative for chest tightness.    Cardiovascular:  Negative for chest pain, palpitations and leg swelling.   Gastrointestinal:  Negative for abdominal pain, constipation, diarrhea, nausea and vomiting.   Genitourinary:  Negative for difficulty urinating, dysuria and frequency.   Musculoskeletal:  Negative for arthralgias, gait problem and myalgias.  "  Neurological:  Positive for headaches. Negative for dizziness and light-headedness.   Psychiatric/Behavioral:  Negative for confusion. The patient is not nervous/anxious.           Objective   /64 (Patient Position: Sitting, Cuff Size: Large)   Pulse 95   Temp 99 °F (37.2 °C) (Tympanic)   Ht 5' 4\" (1.626 m)   Wt 83.9 kg (185 lb)   LMP 09/18/2024   SpO2 99%   BMI 31.76 kg/m²      Physical Exam  Vitals and nursing note reviewed.   Constitutional:       General: She is not in acute distress.     Appearance: Normal appearance. She is not ill-appearing.   HENT:      Head: Normocephalic and atraumatic.      Right Ear: Tympanic membrane, ear canal and external ear normal. There is no impacted cerumen.      Left Ear: Tympanic membrane, ear canal and external ear normal. There is no impacted cerumen.      Nose: Congestion and rhinorrhea present.      Mouth/Throat:      Mouth: Mucous membranes are moist.      Pharynx: Posterior oropharyngeal erythema present. No oropharyngeal exudate.   Eyes:      Extraocular Movements: Extraocular movements intact.      Conjunctiva/sclera: Conjunctivae normal.   Cardiovascular:      Rate and Rhythm: Normal rate and regular rhythm.      Heart sounds: Normal heart sounds. No murmur heard.  Pulmonary:      Effort: Pulmonary effort is normal. No respiratory distress.      Breath sounds: Normal breath sounds. No wheezing, rhonchi or rales.   Musculoskeletal:      Cervical back: Neck supple. No tenderness.   Lymphadenopathy:      Cervical: No cervical adenopathy.   Neurological:      General: No focal deficit present.      Mental Status: She is alert and oriented to person, place, and time. Mental status is at baseline.   Psychiatric:         Mood and Affect: Mood normal.         Behavior: Behavior normal.         Thought Content: Thought content normal.         Judgment: Judgment normal.         "

## 2024-12-03 VITALS
RESPIRATION RATE: 18 BRPM | DIASTOLIC BLOOD PRESSURE: 60 MMHG | HEART RATE: 97 BPM | OXYGEN SATURATION: 97 % | TEMPERATURE: 99.8 F | SYSTOLIC BLOOD PRESSURE: 94 MMHG

## 2024-12-03 LAB
ANION GAP SERPL CALCULATED.3IONS-SCNC: 9 MMOL/L (ref 4–13)
BASOPHILS # BLD AUTO: 0.03 THOUSANDS/ÂΜL (ref 0–0.1)
BASOPHILS NFR BLD AUTO: 0 % (ref 0–1)
BUN SERPL-MCNC: 8 MG/DL (ref 5–25)
CALCIUM SERPL-MCNC: 10.3 MG/DL (ref 8.4–10.2)
CHLORIDE SERPL-SCNC: 102 MMOL/L (ref 96–108)
CO2 SERPL-SCNC: 22 MMOL/L (ref 21–32)
CREAT SERPL-MCNC: 0.63 MG/DL (ref 0.6–1.3)
EOSINOPHIL # BLD AUTO: 0.09 THOUSAND/ÂΜL (ref 0–0.61)
EOSINOPHIL NFR BLD AUTO: 1 % (ref 0–6)
ERYTHROCYTE [DISTWIDTH] IN BLOOD BY AUTOMATED COUNT: 12.5 % (ref 11.6–15.1)
FLUAV AG UPPER RESP QL IA.RAPID: POSITIVE
FLUBV AG UPPER RESP QL IA.RAPID: NEGATIVE
GFR SERPL CREATININE-BSD FRML MDRD: 119 ML/MIN/1.73SQ M
GLUCOSE SERPL-MCNC: 90 MG/DL (ref 65–140)
HCT VFR BLD AUTO: 34.5 % (ref 34.8–46.1)
HGB BLD-MCNC: 12.5 G/DL (ref 11.5–15.4)
IMM GRANULOCYTES # BLD AUTO: 0.06 THOUSAND/UL (ref 0–0.2)
IMM GRANULOCYTES NFR BLD AUTO: 1 % (ref 0–2)
LYMPHOCYTES # BLD AUTO: 0.9 THOUSANDS/ÂΜL (ref 0.6–4.47)
LYMPHOCYTES NFR BLD AUTO: 8 % (ref 14–44)
MCH RBC QN AUTO: 30.6 PG (ref 26.8–34.3)
MCHC RBC AUTO-ENTMCNC: 36.2 G/DL (ref 31.4–37.4)
MCV RBC AUTO: 85 FL (ref 82–98)
MONOCYTES # BLD AUTO: 0.88 THOUSAND/ÂΜL (ref 0.17–1.22)
MONOCYTES NFR BLD AUTO: 8 % (ref 4–12)
NEUTROPHILS # BLD AUTO: 9.04 THOUSANDS/ÂΜL (ref 1.85–7.62)
NEUTS SEG NFR BLD AUTO: 82 % (ref 43–75)
NRBC BLD AUTO-RTO: 0 /100 WBCS
PLATELET # BLD AUTO: 195 THOUSANDS/UL (ref 149–390)
PMV BLD AUTO: 9.8 FL (ref 8.9–12.7)
POTASSIUM SERPL-SCNC: 3.6 MMOL/L (ref 3.5–5.3)
RBC # BLD AUTO: 4.08 MILLION/UL (ref 3.81–5.12)
SARS-COV+SARS-COV-2 AG RESP QL IA.RAPID: NEGATIVE
SODIUM SERPL-SCNC: 133 MMOL/L (ref 135–147)
WBC # BLD AUTO: 11 THOUSAND/UL (ref 4.31–10.16)

## 2024-12-03 PROCEDURE — 36415 COLL VENOUS BLD VENIPUNCTURE: CPT | Performed by: EMERGENCY MEDICINE

## 2024-12-03 PROCEDURE — 96366 THER/PROPH/DIAG IV INF ADDON: CPT

## 2024-12-03 PROCEDURE — 85025 COMPLETE CBC W/AUTO DIFF WBC: CPT | Performed by: EMERGENCY MEDICINE

## 2024-12-03 PROCEDURE — 87804 INFLUENZA ASSAY W/OPTIC: CPT | Performed by: EMERGENCY MEDICINE

## 2024-12-03 PROCEDURE — 80048 BASIC METABOLIC PNL TOTAL CA: CPT | Performed by: EMERGENCY MEDICINE

## 2024-12-03 PROCEDURE — 87811 SARS-COV-2 COVID19 W/OPTIC: CPT | Performed by: EMERGENCY MEDICINE

## 2024-12-03 PROCEDURE — 96365 THER/PROPH/DIAG IV INF INIT: CPT

## 2024-12-03 RX ORDER — ACETAMINOPHEN 10 MG/ML
1000 INJECTION, SOLUTION INTRAVENOUS ONCE
Status: COMPLETED | OUTPATIENT
Start: 2024-12-03 | End: 2024-12-03

## 2024-12-03 RX ORDER — DOXYLAMINE SUCCINATE AND PYRIDOXINE HYDROCHLORIDE, DELAYED RELEASE TABLETS 10 MG/10 MG 10; 10 MG/1; MG/1
TABLET, DELAYED RELEASE ORAL
Qty: 30 TABLET | Refills: 0 | Status: SHIPPED | OUTPATIENT
Start: 2024-12-03

## 2024-12-03 RX ORDER — PYRIDOXINE HCL (VITAMIN B6) 50 MG
25 TABLET ORAL DAILY
Status: DISCONTINUED | OUTPATIENT
Start: 2024-12-03 | End: 2024-12-03 | Stop reason: HOSPADM

## 2024-12-03 RX ADMIN — PYRIDOXINE HCL TAB 50 MG 25 MG: 50 TAB at 01:12

## 2024-12-03 RX ADMIN — DOXYLAMINE SUCCINATE 25 MG: 25 TABLET ORAL at 00:32

## 2024-12-03 RX ADMIN — SODIUM CHLORIDE 1000 ML: 0.9 INJECTION, SOLUTION INTRAVENOUS at 00:32

## 2024-12-03 RX ADMIN — ACETAMINOPHEN 1000 MG: 10 INJECTION INTRAVENOUS at 00:32

## 2024-12-03 NOTE — ED NOTES
Pt requesting to eat at this time. Pt given remigio crackers, apple sauce, and drinking her gatorade at this time. Provider made aware .     Tamy Dick RN  12/03/24 0117

## 2024-12-03 NOTE — ED PROVIDER NOTES
Time reflects when diagnosis was documented in both MDM as applicable and the Disposition within this note       Time User Action Codes Description Comment    12/3/2024  2:07 AM Jennifer Otto Add [J10.1] Influenza A           ED Disposition       ED Disposition   Discharge    Condition   Stable    Date/Time   Tue Dec 3, 2024  2:07 AM    Comment   Yvonne DAVILA Mike discharge to home/self care.                   Assessment & Plan       Medical Decision Making  29-year-old female G3, P0 at 12 weeks gestation presents to the emergency room with multiple complaints.  Patient states she developed a cough a few days ago but the majority of her symptoms started in the past day.  Patient notes body aches, chills, congestion, wheezing and persistent recurrent vomiting.    Patient went to her primary care physician who prescribed her an inhaler and antibiotics.  Patient states her wheezing improved but she has been unable to keep any fluids down prompting her to come to the emergency room for further evaluation and treatment.    Patient denies any abdominal pain other than stating she feels hungry as she has eaten little throughout the day.  Patient denies any vaginal bleeding or discharge.    Impression and plan: Multiple symptoms with a broad differential though based on patient's history and physical, concerns for potential viral etiology.  COVID and influenza testing was sent which demonstrated positive testing for influenza A, likely the source of patient's symptoms.  Bedside ultrasound was completed by myself which demonstrated appropriate fetal movement and a fetal heart rate of 181.  Patient was provided antiemetics after discussion of risks and benefits along with fluids and noted significant improvement in symptoms and is tolerating oral intake.  I discussed continued symptomatic management with the patient, particular considering her pregnancy.  Discussed the need for close follow-up with obstetrics and return to the  emergency room and progression or worsening symptoms and patient affirmed understanding.    Amount and/or Complexity of Data Reviewed  Labs: ordered.    Risk  OTC drugs.  Prescription drug management.        ED Course as of 24 0541   Tue Dec 03, 2024   0216 Patient's flu testing positive, likely the source of her symptoms.  Patient now tolerating oral intake and feels much better.  Patient requesting discharge.  I discussed continued symptomatic management with doxylamine and pyridoxine.  Discussed that this is available over-the-counter but provided prescription for Diclegis.  Discussed risk and benefits of antivirals and after discussion, patient declined.  Discussed and emphasized need for close follow-up with OB/GYN and primary care.  Discussed return precautions in detail.       Medications   sodium chloride 0.9 % bolus 1,000 mL (0 mL Intravenous Stopped 12/3/24 0220)   acetaminophen (Ofirmev) injection 1,000 mg (0 mg Intravenous Stopped 12/3/24 0220)       ED Risk Strat Scores                           SBIRT 20yo+      Flowsheet Row Most Recent Value   Initial Alcohol Screen: US AUDIT-C     1. How often do you have a drink containing alcohol? 0 Filed at: 2024   2. How many drinks containing alcohol do you have on a typical day you are drinking?  0 Filed at: 2024   3a. Male UNDER 65: How often do you have five or more drinks on one occasion? 0 Filed at: 2024   3b. FEMALE Any Age, or MALE 65+: How often do you have 4 or more drinks on one occassion? 0 Filed at: 2024   Audit-C Score 0 Filed at: 2024   AMELIA: How many times in the past year have you...    Used an illegal drug or used a prescription medication for non-medical reasons? Never Filed at: 2024                            History of Present Illness       Chief Complaint   Patient presents with    Flu Symptoms      currently 13 weeks gestation presents with c/o cough, body aches,  congestion, fever and vomiting worsening over the last 2 days.        Past Medical History:   Diagnosis Date    Anxiety     Asthma     Chronic pain disorder     Depression     Heart murmur 02/25/2023    Hepatitis C     Scoliosis     Scoliosis     Self-injurious behavior     Substance abuse (HCC)     Suicide attempt (HCC)     Withdrawal symptoms, drug or narcotic (HCC)       Past Surgical History:   Procedure Laterality Date    DILATION AND EVACUATION        Family History   Problem Relation Age of Onset    Thyroid disease Mother     Substance Abuse Brother     Asthma Brother       Social History     Tobacco Use    Smoking status: Former     Current packs/day: 0.50     Average packs/day: 0.5 packs/day for 15.0 years (7.5 ttl pk-yrs)     Types: Cigarettes     Passive exposure: Never    Tobacco comments:     Just quit one week ago   Vaping Use    Vaping status: Some Days    Substances: Flavoring   Substance Use Topics    Alcohol use: Never    Drug use: Not Currently     Types: Heroin, Benzodiazepines, Methamphetamines     Comment: heroin clean 7 months as of 10/2024      E-Cigarette/Vaping    E-Cigarette Use Current Some Day User       E-Cigarette/Vaping Substances    Nicotine No     THC No     CBD No     Flavoring Yes     Other No     Unknown No       I have reviewed and agree with the history as documented.     HPI    Review of Systems        Objective       ED Triage Vitals [12/02/24 2306]   Temperature Pulse Blood Pressure Respirations SpO2 Patient Position - Orthostatic VS   99.8 °F (37.7 °C) (!) 111 114/75 18 97 % Sitting      Temp Source Heart Rate Source BP Location FiO2 (%) Pain Score    Oral Monitor Left arm -- --      Vitals      Date and Time Temp Pulse SpO2 Resp BP Pain Score FACES Pain Rating User   12/03/24 0225 -- 97 97 % 18 94/60 -- --    12/03/24 0130 -- 87 97 % 18 93/55 -- --    12/03/24 0100 -- 96 96 % 18 97/54 -- --    12/03/24 0040 -- 96 98 % 18 111/67 -- --    12/02/24 2306 99.8 °F (37.7  °C) 111 97 % 18 114/75 -- -- RO            Physical Exam  HENT:      Mouth/Throat:      Mouth: Mucous membranes are moist.   Cardiovascular:      Rate and Rhythm: Regular rhythm. Tachycardia present.   Pulmonary:      Effort: Pulmonary effort is normal.      Breath sounds: Normal breath sounds.   Abdominal:      Palpations: Abdomen is soft.      Tenderness: There is no abdominal tenderness. There is no guarding or rebound.   Musculoskeletal:         General: No swelling or tenderness.   Skin:     General: Skin is warm and dry.   Neurological:      General: No focal deficit present.      Mental Status: She is alert.         Results Reviewed       Procedure Component Value Units Date/Time    FLU/COVID Rapid Antigen (30 min. TAT) - Preferred screening test in ED [[id number]]  (Abnormal) Collected: 12/03/24 0032    Lab Status: Final result Specimen: Nares from Nose Updated: 12/03/24 0125     SARS COV Rapid Antigen Negative     Influenza A Rapid Antigen Positive     Influenza B Rapid Antigen Negative    Narrative:      This test has been performed using the Vitriflexidel Sindhu 2 FLU+SARS Antigen test under the Emergency Use Authorization (EUA). This test has been validated by the  and verified by the performing laboratory. The Sindhu uses lateral flow immunofluorescent sandwich assay to detect SARS-COV, Influenza A and Influenza B Antigen.     The Quidel Sindhu 2 SARS Antigen test does not differentiate between SARS-CoV and SARS-CoV-2.     Negative results are presumptive and may be confirmed with a molecular assay, if necessary, for patient management. Negative results do not rule out SARS-CoV-2 or influenza infection and should not be used as the sole basis for treatment or patient management decisions. A negative test result may occur if the level of antigen in a sample is below the limit of detection of this test.     Positive results are indicative of the presence of viral antigens, but do not rule out bacterial  infection or co-infection with other viruses.     All test results should be used as an adjunct to clinical observations and other information available to the provider.    FOR PEDIATRIC PATIENTS - copy/paste COVID Guidelines URL to browser: [email or web address]    Basic metabolic panel [[id number]]  (Abnormal) Collected: 12/03/24 0032    Lab Status: Final result Specimen: Blood from Arm, Left Updated: 12/03/24 0108     Sodium 133 mmol/L      Potassium 3.6 mmol/L      Chloride 102 mmol/L      CO2 22 mmol/L      ANION GAP 9 mmol/L      BUN 8 mg/dL      Creatinine 0.63 mg/dL      Glucose 90 mg/dL      Calcium 10.3 mg/dL      eGFR 119 ml/min/1.73sq m     Narrative:      [employer] guidelines for Chronic Kidney Disease (CKD):     Stage 1 with normal or high GFR (GFR > 90 mL/min/1.73 square meters)    Stage 2 Mild CKD (GFR = 60-89 mL/min/1.73 square meters)    Stage 3A Moderate CKD (GFR = 45-59 mL/min/1.73 square meters)    Stage 3B Moderate CKD (GFR = 30-44 mL/min/1.73 square meters)    Stage 4 Severe CKD (GFR = 15-29 mL/min/1.73 square meters)    Stage 5 End Stage CKD (GFR <15 mL/min/1.73 square meters)  Note: GFR calculation is accurate only with a steady state creatinine    CBC and differential [[id number]]  (Abnormal) Collected: 12/03/24 0032    Lab Status: Final result Specimen: Blood from Arm, Left Updated: 12/03/24 0043     WBC 11.00 Thousand/uL      RBC 4.08 Million/uL      Hemoglobin 12.5 g/dL      Hematocrit 34.5 %      MCV 85 fL      MCH 30.6 pg      MCHC 36.2 g/dL      RDW 12.5 %      MPV 9.8 fL      Platelets 195 Thousands/uL      nRBC 0 /100 WBCs      Segmented % 82 %      Immature Grans % 1 %      Lymphocytes % 8 %      Monocytes % 8 %      Eosinophils Relative 1 %      Basophils Relative 0 %      Absolute Neutrophils 9.04 Thousands/µL      Absolute Immature Grans 0.06 Thousand/uL      Absolute Lymphocytes 0.90 Thousands/µL       Absolute Monocytes 0.88 Thousand/µL      Eosinophils Absolute 0.09 Thousand/µL      Basophils Absolute 0.03 Thousands/µL             No orders to display       Procedures    ED Medication and Procedure Management   Prior to Admission Medications   Prescriptions Last Dose Informant Patient Reported? Taking?   Ascorbic Acid (Vitamin C) 500 MG CAPS   Yes No   Sig: Take 500 mg by mouth daily   Prenatal Multivit-Min-Fe-FA (PRENATAL 1 + IRON PO)   Yes No   Sig: Take 1 tablet by mouth daily   albuterol (Ventolin HFA) 90 mcg/act inhaler   No No   Sig: Inhale 2 puffs every 6 (six) hours as needed for wheezing   amoxicillin (AMOXIL) 500 mg capsule   Yes No   Sig: Take 500 mg by mouth every 6 (six) hours   Patient not taking: Reported on 12/2/2024   azithromycin (ZITHROMAX) 250 mg tablet   No No   Sig: Take 2 tablets today then 1 tablet daily x 4 days      Facility-Administered Medications: None     Discharge Medication List as of 12/3/2024  2:10 AM        START taking these medications    Details   Doxylamine-Pyridoxine (Diclegis) 10-10 MG TBEC 2 delayed-release tablets PO on a daily basis at bedtime  If symptoms not adequately controlled, increase dose to 4 tablets each day (1 tab in AM, 1 tab mid-afternoon, and 2 tabs at bedtime), Print           CONTINUE these medications which have NOT CHANGED    Details   albuterol (Ventolin HFA) 90 mcg/act inhaler Inhale 2 puffs every 6 (six) hours as needed for wheezing, Starting Mon 12/2/2024, Normal      amoxicillin (AMOXIL) 500 mg capsule Take 500 mg by mouth every 6 (six) hours, Starting Fri 9/6/2024, Historical Med      Ascorbic Acid (Vitamin C) 500 MG CAPS Take 500 mg by mouth daily, Starting Wed 11/20/2024, Until Thu 11/20/2025, Historical Med      azithromycin (ZITHROMAX) 250 mg tablet Take 2 tablets today then 1 tablet daily x 4 days, Normal      Prenatal Multivit-Min-Fe-FA (PRENATAL 1 + IRON PO) Take 1 tablet by mouth daily, Starting Wed 10/23/2024, Historical Med            No discharge procedures on file.  ED SEPSIS DOCUMENTATION   Time reflects when diagnosis was documented in both MDM as applicable and the Disposition within this note       Time User Action Codes Description Comment    12/3/2024  2:07 AM Jennifer Otto Add [J10.1] Influenza A                  Gabino Gage MD  12/03/24 0541

## 2025-03-07 PROBLEM — Z14.8 CARRIER OF SPINAL MUSCULAR ATROPHY: Chronic | Status: ACTIVE | Noted: 2024-12-05

## 2025-05-31 DIAGNOSIS — J32.9 SINOBRONCHITIS: ICD-10-CM

## 2025-05-31 DIAGNOSIS — J40 SINOBRONCHITIS: ICD-10-CM

## 2025-06-01 RX ORDER — ALBUTEROL SULFATE 90 UG/1
2 INHALANT RESPIRATORY (INHALATION) EVERY 6 HOURS PRN
Qty: 8.5 G | Refills: 5 | Status: SHIPPED | OUTPATIENT
Start: 2025-06-01